# Patient Record
Sex: FEMALE | Race: WHITE | NOT HISPANIC OR LATINO | Employment: OTHER | URBAN - NONMETROPOLITAN AREA
[De-identification: names, ages, dates, MRNs, and addresses within clinical notes are randomized per-mention and may not be internally consistent; named-entity substitution may affect disease eponyms.]

---

## 2023-03-10 ENCOUNTER — PATIENT MESSAGE (OUTPATIENT)
Dept: PRIMARY CARE | Facility: CLINIC | Age: 58
End: 2023-03-10
Payer: COMMERCIAL

## 2023-03-10 DIAGNOSIS — B37.9 CANDIDA INFECTION: Primary | ICD-10-CM

## 2023-03-10 RX ORDER — FLUCONAZOLE 100 MG/1
100 TABLET ORAL ONCE
Qty: 3 TABLET | Refills: 1 | Status: SHIPPED | OUTPATIENT
Start: 2023-03-10 | End: 2023-03-10

## 2023-04-08 DIAGNOSIS — Z79.890 HORMONE REPLACEMENT THERAPY: Primary | ICD-10-CM

## 2023-04-08 RX ORDER — ESTRADIOL 0.04 MG/D
PATCH, EXTENDED RELEASE TRANSDERMAL
Qty: 8 PATCH | Refills: 0 | Status: SHIPPED | OUTPATIENT
Start: 2023-04-08 | End: 2023-05-10

## 2023-05-01 DIAGNOSIS — E03.9 HYPOTHYROIDISM, UNSPECIFIED TYPE: Primary | ICD-10-CM

## 2023-05-01 RX ORDER — LEVOTHYROXINE SODIUM 75 UG/1
TABLET ORAL
Qty: 90 TABLET | Refills: 0 | Status: SHIPPED | OUTPATIENT
Start: 2023-05-01 | End: 2023-08-24

## 2023-05-10 DIAGNOSIS — Z79.890 HORMONE REPLACEMENT THERAPY: ICD-10-CM

## 2023-05-10 RX ORDER — ESTRADIOL 0.04 MG/D
PATCH, EXTENDED RELEASE TRANSDERMAL
Qty: 8 PATCH | Refills: 1 | Status: SHIPPED | OUTPATIENT
Start: 2023-05-10 | End: 2023-05-24 | Stop reason: SDUPTHER

## 2023-05-24 ENCOUNTER — OFFICE VISIT (OUTPATIENT)
Dept: PRIMARY CARE | Facility: CLINIC | Age: 58
End: 2023-05-24
Payer: COMMERCIAL

## 2023-05-24 VITALS
BODY MASS INDEX: 26.36 KG/M2 | HEIGHT: 66 IN | DIASTOLIC BLOOD PRESSURE: 76 MMHG | WEIGHT: 164 LBS | HEART RATE: 68 BPM | SYSTOLIC BLOOD PRESSURE: 120 MMHG | OXYGEN SATURATION: 98 %

## 2023-05-24 DIAGNOSIS — Z79.890 HORMONE REPLACEMENT THERAPY: ICD-10-CM

## 2023-05-24 DIAGNOSIS — M25.50 ARTHRALGIA OF MULTIPLE JOINTS: ICD-10-CM

## 2023-05-24 DIAGNOSIS — Z01.419 ENCOUNTER FOR ANNUAL ROUTINE GYNECOLOGICAL EXAMINATION: Primary | ICD-10-CM

## 2023-05-24 DIAGNOSIS — L30.9 DERMATITIS: ICD-10-CM

## 2023-05-24 DIAGNOSIS — N84.2 VAGINAL POLYP: ICD-10-CM

## 2023-05-24 DIAGNOSIS — Z12.31 SCREENING MAMMOGRAM, ENCOUNTER FOR: ICD-10-CM

## 2023-05-24 PROBLEM — M85.80 OSTEOPENIA: Status: ACTIVE | Noted: 2023-05-24

## 2023-05-24 PROBLEM — E53.8 VITAMIN B12 DEFICIENCY: Status: ACTIVE | Noted: 2023-05-24

## 2023-05-24 PROBLEM — A63.0 CONDYLOMA ACUMINATUM: Status: ACTIVE | Noted: 2023-05-24

## 2023-05-24 PROBLEM — E03.9 HYPOTHYROIDISM: Status: ACTIVE | Noted: 2023-05-24

## 2023-05-24 PROBLEM — J30.9 ALLERGIC RHINITIS: Status: ACTIVE | Noted: 2023-05-24

## 2023-05-24 PROBLEM — N95.1 MENOPAUSAL SYMPTOMS: Status: ACTIVE | Noted: 2023-05-24

## 2023-05-24 PROBLEM — S16.1XXA CERVICAL STRAIN: Status: ACTIVE | Noted: 2023-05-24

## 2023-05-24 PROBLEM — G25.81 RESTLESS LEG SYNDROME: Status: ACTIVE | Noted: 2023-05-24

## 2023-05-24 PROBLEM — M25.561 KNEE PAIN, RIGHT: Status: ACTIVE | Noted: 2023-05-24

## 2023-05-24 PROBLEM — E28.39 ESTROGEN DEFICIENCY: Status: ACTIVE | Noted: 2023-05-24

## 2023-05-24 PROBLEM — E78.5 HYPERLIPIDEMIA: Status: ACTIVE | Noted: 2023-05-24

## 2023-05-24 PROBLEM — G62.9 NEUROPATHY: Status: ACTIVE | Noted: 2023-05-24

## 2023-05-24 PROBLEM — R53.83 FATIGUE: Status: ACTIVE | Noted: 2023-05-24

## 2023-05-24 PROBLEM — H04.122 DRY EYE SYNDROME OF LEFT EYE: Status: ACTIVE | Noted: 2023-05-24

## 2023-05-24 PROBLEM — N60.29 FIBROADENOSIS OF BREAST: Status: ACTIVE | Noted: 2023-05-24

## 2023-05-24 PROBLEM — H15.102 EPISCLERITIS OF LEFT EYE: Status: ACTIVE | Noted: 2023-05-24

## 2023-05-24 PROBLEM — M51.26 LUMBAR HERNIATED DISC: Status: ACTIVE | Noted: 2023-05-24

## 2023-05-24 PROBLEM — B35.1 ONYCHOMYCOSIS OF GREAT TOE: Status: ACTIVE | Noted: 2023-05-24

## 2023-05-24 PROBLEM — E55.9 VITAMIN D DEFICIENCY: Status: ACTIVE | Noted: 2023-05-24

## 2023-05-24 PROBLEM — G47.00 INSOMNIA: Status: ACTIVE | Noted: 2023-05-24

## 2023-05-24 PROBLEM — M19.079 ARTHRITIS OF GREAT TOE AT METATARSOPHALANGEAL JOINT: Status: ACTIVE | Noted: 2023-05-24

## 2023-05-24 PROBLEM — N90.5 VULVAR ATROPHY: Status: ACTIVE | Noted: 2023-05-24

## 2023-05-24 PROBLEM — M72.2 PLANTAR FASCIITIS: Status: ACTIVE | Noted: 2023-05-24

## 2023-05-24 PROBLEM — E78.49 ESSENTIAL FAMILIAL HYPERLIPIDEMIA: Status: ACTIVE | Noted: 2023-05-24

## 2023-05-24 PROBLEM — M79.7 FIBROMYALGIA: Status: ACTIVE | Noted: 2023-05-24

## 2023-05-24 PROCEDURE — 99396 PREV VISIT EST AGE 40-64: CPT | Performed by: FAMILY MEDICINE

## 2023-05-24 PROCEDURE — 1036F TOBACCO NON-USER: CPT | Performed by: FAMILY MEDICINE

## 2023-05-24 RX ORDER — METHOCARBAMOL 750 MG/1
1 TABLET, FILM COATED ORAL EVERY 6 HOURS PRN
COMMUNITY
Start: 2017-08-15 | End: 2023-05-24 | Stop reason: ALTCHOICE

## 2023-05-24 RX ORDER — CALCITRIOL 0.25 UG/1
0.25 CAPSULE ORAL DAILY
COMMUNITY
End: 2023-05-24 | Stop reason: ENTERED-IN-ERROR

## 2023-05-24 RX ORDER — EPINEPHRINE 0.22MG
AEROSOL WITH ADAPTER (ML) INHALATION
COMMUNITY

## 2023-05-24 RX ORDER — LANOLIN ALCOHOL/MO/W.PET/CERES
1 CREAM (GRAM) TOPICAL DAILY
COMMUNITY
Start: 2015-12-07

## 2023-05-24 RX ORDER — ETODOLAC 400 MG/1
400 TABLET, EXTENDED RELEASE ORAL 2 TIMES DAILY PRN
COMMUNITY
End: 2023-05-24 | Stop reason: SDUPTHER

## 2023-05-24 RX ORDER — CALCIUM CARBONATE 300MG(750)
400 TABLET,CHEWABLE ORAL DAILY
COMMUNITY

## 2023-05-24 RX ORDER — ETODOLAC 400 MG/1
400 TABLET, EXTENDED RELEASE ORAL DAILY PRN
Qty: 90 TABLET | Refills: 1 | Status: SHIPPED | OUTPATIENT
Start: 2023-05-24

## 2023-05-24 RX ORDER — MULTIVITAMIN
1 TABLET ORAL DAILY
COMMUNITY
End: 2023-05-24 | Stop reason: ALTCHOICE

## 2023-05-24 RX ORDER — CLOBETASOL PROPIONATE 0.46 MG/ML
SOLUTION TOPICAL
Qty: 50 ML | Refills: 11 | Status: SHIPPED | OUTPATIENT
Start: 2023-05-24 | End: 2023-11-17 | Stop reason: ALTCHOICE

## 2023-05-24 RX ORDER — MUPIROCIN 20 MG/G
1 OINTMENT TOPICAL 2 TIMES DAILY PRN
Status: ON HOLD | COMMUNITY
End: 2024-01-09 | Stop reason: ALTCHOICE

## 2023-05-24 RX ORDER — FLUTICASONE PROPIONATE 50 MCG
SPRAY, SUSPENSION (ML) NASAL
COMMUNITY
Start: 2017-02-02

## 2023-05-24 RX ORDER — ACETAMINOPHEN 500 MG
TABLET ORAL
COMMUNITY
End: 2023-05-24 | Stop reason: ALTCHOICE

## 2023-05-24 RX ORDER — CLOBETASOL PROPIONATE 0.46 MG/ML
SOLUTION TOPICAL
COMMUNITY
Start: 2015-01-16 | End: 2023-05-24 | Stop reason: SDUPTHER

## 2023-05-24 RX ORDER — ALPRAZOLAM 1 MG/1
1 TABLET ORAL NIGHTLY PRN
COMMUNITY
End: 2024-05-22 | Stop reason: SDUPTHER

## 2023-05-24 RX ORDER — OMEGA-3 FATTY ACIDS 1000 MG
1000 CAPSULE ORAL 2 TIMES DAILY
COMMUNITY
End: 2023-11-17 | Stop reason: ALTCHOICE

## 2023-05-24 RX ORDER — PROGESTERONE, MICRONIZED 100 %
POWDER (GRAM) MISCELLANEOUS
COMMUNITY
End: 2024-02-26

## 2023-05-24 RX ORDER — ESTRADIOL 0.04 MG/D
PATCH, EXTENDED RELEASE TRANSDERMAL
Qty: 24 PATCH | Refills: 3 | Status: SHIPPED | OUTPATIENT
Start: 2023-05-25 | End: 2023-11-15 | Stop reason: SDUPTHER

## 2023-05-24 NOTE — PROGRESS NOTES
Subjective   Patient ID: Rachel Hobbs is a 58 y.o. female who presents for Gynecologic Exam.    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    She is living with Ryan in a rental in the area     Still lots of stress with her parents           [facility] -      Last WWE - 2022   (did not do pelvic)     Reviewed WWE paper today     Last pap - NONE NEEDED - S/P HYST - BUT ON HRT ;LAST Pelvic exam done   - will do today   (Need GC/CT screen?) - low risk   LMP - NONE SINCE HYST - AGE 37 - COMPLETE HYST DUE TO ENDOMETRIOSIS - HRT SINCE THEN  problems? - Still PAINFUL INTERCOURSE - AVOIDS - very tight pelvic muscles     HRT - on Vivelle-Dot PATCH TWICE A WEEK - 0.0375 AND PROGESTERONE CREAM THIGHS -   VAGINAL ESTROGEN DID NOT HELP. 2021- tried the 0.025 - found herself way too irritable - feels much better back on 0.0375  (CANNOT HAVE THE WEEKLY ESTROGEN PATCH - CLIMERA or Estradiol - REACTED TO both of them )    We have discussed risks of HRT therapy reglarly - she voices understanding and wishes to continue despite risk.        - 1  Para - 1    Last mammogram - 2022 - MAMM WNL   Breast issues? - NONE CURRENTLY    Bone density - OSTEOPENIA; ON VIT D; has calcium in her diet   Last DEXA 2016 (STABLE FROM 10 YEARS EARLIER)     Last colonoscopy - 2019 - Colonoscopy - had polyp removed - good for 5 years     Last cholesterol - VERY HIGH CHOLESTEROL - INTOL OF STATINS, ZETIA AND REPATHA - We have elected to stop checking as she is not willing to try anything else     Last blood sugar - 2022 - WNL     Hep C screen? -   NEG 2022   HIV screen? - LOW RISK    vaccines -   ALLERGIC TO TETNAS,   DOES NOT GET FLU SHOTS;   SHINGLES - NOT SURE   DID GET COVID VACCINES x 2 and boosterS     vision - did go in 2021     dentist - DOES GO REGULARLY    BMI/weight - 28    nutrition - DOING WELL; tried no gluten - no help     exercise - busy - but not regular exercise  "    depression - she states she is feeling she is doing well     smoking status - FORMER SMOKER - QUIT about (2013) - smoked 3 - 6 cig a day for 25 years   Need Screening Lung CT? Does not qualify     alcohol consumption - RARE    Need coronary calcium score? - ZERO IN 2016 (that was second scan)       Living Will and Medical POA papers. - DONE - BUT DID NOT BRING IN           CHRONIC ISSUES REVIEWED TODAY :      Hypothyroid: Synthroid 75 one daily - 1/2 Tues and Thurs ;     Familial Hyperlipidemia - (LDL over 300) - HAS NOT BEEN ABLE TO TOLERATE THE STATINS, ZETIA or Repatha    - refuses to try more , we have elected to stop checking   Cor Score Zero x 2 2011 and 2016       Fibromyalgia and Diffuse DDD - diffuse chronic pain   Spinal surgery with DR Terrell at [facility] on July 9th 2019 - 2 spurs were removed and found ligaments pressing against a nerve - took away stabbing pains   Did not feel well on Gabapentin   Meds - Etodolac  mg prn; methocarbamol  - has not taken     insomnia -sleeps well the first few hours , then wakes up and thinks   (Trazodone did not help)     RLS - tried supplement - did not help much       Vit D Def -  2000 SL every other week         Gets sores in the pubic area occas - mupirocin helps     AR - stays on flonase       Has a small bottle of alprazolam for travel only       Review of Systems    Objective   /76   Pulse 68   Ht 1.676 m (5' 6\")   Wt 74.4 kg (164 lb)   SpO2 98%   BMI 26.47 kg/m²     Physical Exam  Vitals reviewed.   Constitutional:       Appearance: Normal appearance.   HENT:      Head: Normocephalic and atraumatic.      Right Ear: Tympanic membrane, ear canal and external ear normal.      Left Ear: Tympanic membrane, ear canal and external ear normal.      Nose: Nose normal.      Mouth/Throat:      Mouth: Mucous membranes are moist.      Pharynx: No posterior oropharyngeal erythema.   Eyes:      General: No scleral icterus.     Extraocular Movements: " Extraocular movements intact.      Pupils: Pupils are equal, round, and reactive to light.   Cardiovascular:      Rate and Rhythm: Normal rate and regular rhythm.      Pulses: Normal pulses.      Heart sounds: Normal heart sounds. No murmur heard.  Pulmonary:      Effort: Pulmonary effort is normal. No respiratory distress.      Breath sounds: Normal breath sounds. No wheezing.   Chest:      Chest wall: No deformity.   Breasts:     Laron Score is 5.      Breasts are symmetrical.      Right: Normal. No mass.      Left: Normal. No mass.   Abdominal:      General: Bowel sounds are normal. There is no distension.      Palpations: Abdomen is soft.      Tenderness: There is no abdominal tenderness.   Genitourinary:     General: Normal vulva.      Exam position: Lithotomy position.      Pubic Area: No rash.       Labia:         Right: No lesion.         Left: No lesion.       Vagina: Lesions present. No vaginal discharge.      Uterus: Absent.       Adnexa: Right adnexa normal and left adnexa normal.      Comments: ON RIGHT WALL OF VAGINA THERE IS A 1 CM PEDUNCULATED POLYP   Musculoskeletal:         General: Normal range of motion.      Cervical back: Neck supple. No rigidity.      Right lower leg: No edema.      Left lower leg: No edema.   Lymphadenopathy:      Cervical: No cervical adenopathy.      Upper Body:      Right upper body: No axillary adenopathy.      Left upper body: No axillary adenopathy.   Skin:     General: Skin is warm and dry.      Findings: No rash.   Neurological:      General: No focal deficit present.      Mental Status: She is alert and oriented to person, place, and time.   Psychiatric:         Mood and Affect: Mood normal.         Thought Content: Thought content normal.         Assessment/Plan   Problem List Items Addressed This Visit          Medium    Arthralgia of multiple joints    Relevant Medications    etodolac XL (Lodine XL) 400 mg 24 hr tablet    Hormone replacement therapy    Relevant  Medications    Vivelle-Dot 0.0375 mg/24 hr (Start on 5/25/2023)    Screening mammogram, encounter for    Relevant Orders    BI mammo bilateral screening tomosynthesis    Dermatitis    Relevant Medications    clobetasol (Temovate) 0.05 % external solution    Encounter for annual routine gynecological examination - Primary    Vaginal polyp    Relevant Orders    Referral to Gynecology     Not doing too bad -   Lots of stress - urged counseling    She wants to continue HRT despite risk    Vaginal polyp felt today  - refer to gyn     We discussed at visit any disease processes that were of concern as well as the risks, benefits and instructions of any new medication provided.    See orders and discussion section for information handed to patient on their Clinical Summary.   Patient (and/or caretaker of patient if present)  stated all questions were answered, and they voiced understanding of instructions.

## 2023-05-24 NOTE — PATIENT INSTRUCTIONS
Try to find the amount of vit D that you can tolerate  - up to 2000 units daily     I strongly recommend starting with counseling     I want you to see a gyn about the vaginal polyp -  Call DR Perez -   [phone] -   The appt should not be more than a month away.           I can see you back in 6 months  if all is fine.         WELL VISIT/PREVENTATIVE VISIT INSTRUCTIONS:        Call [phone] to schedule any testing ordered at [facility].      For a mammogram, call   [phone] .    Please work on healthy nutrition,  optimal sleep, and regular exercise to feel better.    If you smoke - please quit, and if you drink alcohol, please limit your intake.     Generally speaking,  good nutrition consists of lean meats, like chicken, fish and turkey (not fried);    plenty of fruits and vegetables (the fresher the better);   Less sugars, saturated fats, and processed foods - especially those made with white flour.   Try to eat the majority of your grain foods  as whole grains.   Keep an eye on your total calories in a day and serving sizes on packaging - this will help you limit your overall intake.    Remember, to lose weight (if you need to), your total calorie intake has to be about 300 - 500 calories less than what you burn in a day.   That number depends on your age, gender and body size.   Some people find a fitbit (calorie tracking device) helpful.      Please be sure to see the dentist every 6 months.    Please see the eye doctor no longer than every 2 years.    When you have your Living Will and Medical Power of  papers completed   (they have to be witnessed by 2 non-relatives or notarized to be legally binding),     please keep your originals in a safe place in your home, but make sure all your physicians have copies and that you take copies with you when you go to the hospital.        GETTING TEST RESULTS:    YOU WILL ALWAYS FIND OUT ABOUT ALL YOUR TEST RESULTS FROM ME.  I do not use  "the \"no news is good news\" policy.   The only time you would not, is if I have told you to get the testing done, and make a follow up appointment to go over it.    Then I will be waiting to talk to you at the visit about your test results.     I have a few different ways I get test results to you  (if its something urgent, we call you)  :       If you have a Secureach card -  I will have your test results on your secureach card within a week.  You should get a phone call telling you when the results are on the card, or just call the card in a week.   If two weeks go  by and you have not heard anything, call the card to see if the results are there,  and if not,  leave me a message.  If you are having trouble using Secureach, they have a support line you should call :   [phone];   If you have lost your card, I need to know.     IT'S VERY IMPORTANT THAT YOU CALL TO LISTEN TO YOUR RESULTS, AS IT THEN LETS ME KNOW YOU GOT YOUR RESULTS.        If you get your test results on MY CHART,  you may commonly see your results even before I do.      I will always annotate a message on your results so you know that I saw them and how I feel about them.         IF I mail your results to you,   I need to hear from you if you do not receive them within 2 weeks.      Be sure to let me know your preference for getting results.         BILLING FOR PREVENTATIVE VISITS.     Most insurance companies pay for a yearly \"Wellness Check\"  or they may call it a \"Preventative Physical\"   - but it's important to know what your plan covers ahead of the visit.    It's also a good idea to find out what sort of preventative testing they will cover for screening tests - like cholesterol, blood sugar, or colonoscopies. Also, find out which vaccines are covered and if you have any responsibility in paying for them.       If at your Wellness Visit,  we cover anything to do with problems/issues  you are having or  chronic medications/ medical " conditions you have,   there will ALSO be a regular office charge that day.     Blood work  or testing obtained that has anything to do with an acute issue or chronic condition is billed under that diagnosis and not screening.       It's a good idea to find out from your insurance what is covered and what is your responsibility for all of the above possible charges.       Most importantly,   if you ever have any questions or concerns that cannot wait until your next visit with me,  you can message me through MY CHART or call the office and leave a voice mail message  (please allow for at least 24 hours for responses for these messages).       Take good care.   Dr Hubbard

## 2023-08-24 DIAGNOSIS — E03.9 HYPOTHYROIDISM, UNSPECIFIED TYPE: ICD-10-CM

## 2023-08-24 RX ORDER — LEVOTHYROXINE SODIUM 75 UG/1
TABLET ORAL
Qty: 90 TABLET | Refills: 1 | Status: SHIPPED | OUTPATIENT
Start: 2023-08-24 | End: 2023-11-17 | Stop reason: SDUPTHER

## 2023-11-15 ENCOUNTER — TELEPHONE (OUTPATIENT)
Dept: PRIMARY CARE | Facility: CLINIC | Age: 58
End: 2023-11-15
Payer: COMMERCIAL

## 2023-11-15 DIAGNOSIS — Z79.890 HORMONE REPLACEMENT THERAPY: ICD-10-CM

## 2023-11-15 RX ORDER — ESTRADIOL 0.04 MG/D
FILM, EXTENDED RELEASE TRANSDERMAL
Qty: 24 PATCH | Refills: 3 | Status: SHIPPED | OUTPATIENT
Start: 2023-11-15 | End: 2023-11-17

## 2023-11-15 NOTE — TELEPHONE ENCOUNTER
Her  vivelle dot patch .0375 has been unavailable for over a month and she has been going without it.  She is willing to try the generic.  Please send over the prescription without the MILTON, which is what I have so I cannot dispense the generic.

## 2023-11-17 ENCOUNTER — OFFICE VISIT (OUTPATIENT)
Dept: PRIMARY CARE | Facility: CLINIC | Age: 58
End: 2023-11-17
Payer: COMMERCIAL

## 2023-11-17 VITALS
BODY MASS INDEX: 26.15 KG/M2 | SYSTOLIC BLOOD PRESSURE: 124 MMHG | WEIGHT: 162 LBS | OXYGEN SATURATION: 99 % | HEART RATE: 77 BPM | DIASTOLIC BLOOD PRESSURE: 78 MMHG

## 2023-11-17 DIAGNOSIS — M79.7 FIBROMYALGIA: ICD-10-CM

## 2023-11-17 DIAGNOSIS — E03.9 HYPOTHYROIDISM, UNSPECIFIED TYPE: ICD-10-CM

## 2023-11-17 DIAGNOSIS — E53.8 VITAMIN B12 DEFICIENCY: ICD-10-CM

## 2023-11-17 DIAGNOSIS — E28.39 ESTROGEN DEFICIENCY: Primary | ICD-10-CM

## 2023-11-17 DIAGNOSIS — E55.9 VITAMIN D DEFICIENCY: ICD-10-CM

## 2023-11-17 DIAGNOSIS — E78.49 OTHER HYPERLIPIDEMIA: ICD-10-CM

## 2023-11-17 PROBLEM — R53.83 MALAISE AND FATIGUE: Status: ACTIVE | Noted: 2019-03-15

## 2023-11-17 PROBLEM — R53.81 MALAISE AND FATIGUE: Status: ACTIVE | Noted: 2019-03-15

## 2023-11-17 PROBLEM — M54.50 LOW BACK PAIN: Status: ACTIVE | Noted: 2019-03-15

## 2023-11-17 PROBLEM — E78.00 PURE HYPERCHOLESTEROLEMIA: Status: ACTIVE | Noted: 2019-03-15

## 2023-11-17 PROBLEM — M13.0 POLYARTHROPATHY: Status: ACTIVE | Noted: 2019-03-15

## 2023-11-17 PROBLEM — M75.102 TEAR OF LEFT ROTATOR CUFF: Status: RESOLVED | Noted: 2023-11-17 | Resolved: 2023-11-17

## 2023-11-17 PROBLEM — L73.9 DISORDER OF SEBACEOUS GLANDS: Status: ACTIVE | Noted: 2019-04-22

## 2023-11-17 PROBLEM — M51.369 DEGENERATIVE DISC DISEASE, LUMBAR: Status: ACTIVE | Noted: 2019-04-19

## 2023-11-17 PROBLEM — M51.36 DEGENERATIVE DISC DISEASE, LUMBAR: Status: ACTIVE | Noted: 2019-04-19

## 2023-11-17 LAB
25(OH)D3 SERPL-MCNC: 23 NG/ML (ref 30–100)
ALBUMIN SERPL BCP-MCNC: 5.1 G/DL (ref 3.4–5)
ALP SERPL-CCNC: 52 U/L (ref 33–110)
ALT SERPL W P-5'-P-CCNC: 22 U/L (ref 7–45)
ANION GAP SERPL CALC-SCNC: 12 MMOL/L (ref 10–20)
AST SERPL W P-5'-P-CCNC: 21 U/L (ref 9–39)
BASOPHILS # BLD AUTO: 0.04 X10*3/UL (ref 0–0.1)
BASOPHILS NFR BLD AUTO: 0.8 %
BILIRUB SERPL-MCNC: 0.5 MG/DL (ref 0–1.2)
BUN SERPL-MCNC: 17 MG/DL (ref 6–23)
CALCIUM SERPL-MCNC: 9.7 MG/DL (ref 8.6–10.3)
CHLORIDE SERPL-SCNC: 102 MMOL/L (ref 98–107)
CO2 SERPL-SCNC: 29 MMOL/L (ref 21–32)
CREAT SERPL-MCNC: 1.01 MG/DL (ref 0.5–1.05)
EOSINOPHIL # BLD AUTO: 0.16 X10*3/UL (ref 0–0.7)
EOSINOPHIL NFR BLD AUTO: 3.2 %
ERYTHROCYTE [DISTWIDTH] IN BLOOD BY AUTOMATED COUNT: 13.2 % (ref 11.5–14.5)
GFR SERPL CREATININE-BSD FRML MDRD: 64 ML/MIN/1.73M*2
GLUCOSE SERPL-MCNC: 90 MG/DL (ref 74–99)
HCT VFR BLD AUTO: 45.6 % (ref 36–46)
HGB BLD-MCNC: 14.9 G/DL (ref 12–16)
IMM GRANULOCYTES # BLD AUTO: 0.01 X10*3/UL (ref 0–0.7)
IMM GRANULOCYTES NFR BLD AUTO: 0.2 % (ref 0–0.9)
LYMPHOCYTES # BLD AUTO: 1.73 X10*3/UL (ref 1.2–4.8)
LYMPHOCYTES NFR BLD AUTO: 34.5 %
MCH RBC QN AUTO: 30 PG (ref 26–34)
MCHC RBC AUTO-ENTMCNC: 32.7 G/DL (ref 32–36)
MCV RBC AUTO: 92 FL (ref 80–100)
MONOCYTES # BLD AUTO: 0.37 X10*3/UL (ref 0.1–1)
MONOCYTES NFR BLD AUTO: 7.4 %
NEUTROPHILS # BLD AUTO: 2.7 X10*3/UL (ref 1.2–7.7)
NEUTROPHILS NFR BLD AUTO: 53.9 %
NRBC BLD-RTO: 0 /100 WBCS (ref 0–0)
PLATELET # BLD AUTO: 251 X10*3/UL (ref 150–450)
POTASSIUM SERPL-SCNC: 4.8 MMOL/L (ref 3.5–5.3)
PROT SERPL-MCNC: 7.4 G/DL (ref 6.4–8.2)
RBC # BLD AUTO: 4.97 X10*6/UL (ref 4–5.2)
SODIUM SERPL-SCNC: 138 MMOL/L (ref 136–145)
TSH SERPL-ACNC: 1.31 MIU/L (ref 0.44–3.98)
VIT B12 SERPL-MCNC: 376 PG/ML (ref 211–911)
WBC # BLD AUTO: 5 X10*3/UL (ref 4.4–11.3)

## 2023-11-17 PROCEDURE — 80053 COMPREHEN METABOLIC PANEL: CPT

## 2023-11-17 PROCEDURE — 36415 COLL VENOUS BLD VENIPUNCTURE: CPT

## 2023-11-17 PROCEDURE — 82607 VITAMIN B-12: CPT

## 2023-11-17 PROCEDURE — 84443 ASSAY THYROID STIM HORMONE: CPT

## 2023-11-17 PROCEDURE — 99214 OFFICE O/P EST MOD 30 MIN: CPT | Performed by: FAMILY MEDICINE

## 2023-11-17 PROCEDURE — 82306 VITAMIN D 25 HYDROXY: CPT

## 2023-11-17 PROCEDURE — 1036F TOBACCO NON-USER: CPT | Performed by: FAMILY MEDICINE

## 2023-11-17 PROCEDURE — 85025 COMPLETE CBC W/AUTO DIFF WBC: CPT

## 2023-11-17 RX ORDER — LEVOTHYROXINE SODIUM 75 UG/1
TABLET ORAL
Qty: 90 TABLET | Refills: 3 | Status: SHIPPED | OUTPATIENT
Start: 2023-11-17

## 2023-11-17 RX ORDER — MV-MIN/FA/VIT K/LUTEIN/ZEAXANT 200MCG-5MG
CAPSULE ORAL
COMMUNITY

## 2023-11-17 RX ORDER — ESTRADIOL 0.04 MG/D
1 FILM, EXTENDED RELEASE TRANSDERMAL 2 TIMES WEEKLY
Qty: 24 PATCH | Refills: 3 | Status: SHIPPED | OUTPATIENT
Start: 2023-11-20 | End: 2024-05-22 | Stop reason: RX

## 2023-11-17 NOTE — PATIENT INSTRUCTIONS
"You may want to try topical Voltaren gel for pain       You will always hear about your test results.     The easiest way, if you have a smart phone or computer access, is to sign up for \"My Chart.\"    The electronic way to see your medical record, test results and visit summaries/instructions.   You will see your test results on this system before I do.   But, I will always make comments on the results when I see them.   If over a week goes by and I have not made comments on them,  please let me know.    Something may have gone wrong and I did not see them.    If you are having issues with getting onto My Chart , the patient support  number is [phone].       If you do not have a smart phone or computer access, I can still leave test results on your Secureach card if you have one,   or we can call or mail you your results.   IF a week goes by and you have not heard about your results,  please let me know.          I did send in the MILTON Minivelle      Appt can be in 6 mos -  Wellness         For General Healthy Nutrition    (Remember - NOT A DIET!   Diets are only good for class reunions.)    These are my general good nutrition recommendations for most people.   I use the term \" diet \"  in these instructions to mean your overall nutrition - how you eat and drink.   If we talked about something different during your visit with me,  other than what is written below,  follow that advice instead.       For most people,  eating healthier means getting less added sugar and less processed foods in your diet    The fresher the better.    Added sugar is now a part of the nutrition label on manufactured food, so you can keep an eye on it easier.    But basically,  foods and beverages  that contain regular sugar and corn syrup are the main sources of added sugars.  Eating as little of these foods as you can is best.   One shocking example of the epidemic of added sugar is soda.    One can of regular soda contains about as " much added sugar as 3 regular size doughnuts!     The other issue with processed foods is the amount of processed grains they contain , such as white flour.    This is also something you want to try to limit in your diet.     But, grain products are very important for your nutrition.    Whole grains are better for your body.     Cutting back on white breads, traditional pasta, baked goods, white rice,  and processed cereals will be healthier for you.   The better choices include whole grain breads,  whole wheat pasta,  brown rice, quinoa, barley, steel cut  or rolled oats.   If you eat cereal for breakfast, try to look for one made with whole grains and less sugar.   There are many people who have a problem with gluten, for a large variety of reasons.    Generally,  products made with wheat flour , barley or rye are the primary source of gluten.       Cutting back on saturated fats is important.    You want the majority of the meat that you eat to be chicken, fish or turkey.   Baked or broiled is best -  fried adds too much fat.    There are healthy fats that are important - fat is important for holding down appetite, vitamin absorption and several metabolic processes in the body.  Monounsaturated fats raise HDL (good cholesterol) and lower LDL (bad cholesterol).   Olive oil, peanut oil, nuts, seeds, and avocados are great sources of the good fats.       Ideas are:   Trade sour cream dip for hummus (which is rich in olive oil) or guacamole; use veggies or whole-wheat chips to dip.    Nuts are an excellent source of protein and healthy fats.   Tree nuts are the best kind, such as almonds or walnuts.   Just be careful - they are high in calories, so stick to a serving size.  (Most are about 200 calories for a 1/4 cup)      Proteins are very important for your body, and they also hold down your appetite.   Try to have protein with every meal.    These generally are meats, nuts, many beans, legumes, eggs, and dairy.    "You will find protein in whole grain products and some green vegetables have a little too.     When you have dairy (if you can - many people are lactose intolerant) try to make it low fat.    Ideas are 1% milk, lowfat yogurt or cheeses, low fat cottage cheese.   I don't generally recommend FAT FREE because they often contain artificial products to improve taste, and the fat helps hold down your appetite.   If you are lactose intolerant, try to see if taking Lactaid before having dairy helps.      Fresh fruits and vegetables are VERY important.  The brighter the better.   Many vegetables are considered \"Free Foods\" - meaning you can eat as much as you want, and it does not matter.  These include tomatoes, cucumbers, celery, peppers, all the various lettuces and kale - to name a few.   Potatoes, corn and peas are starchy, so do have more calories, but are still healthy - you just want to watch the amount of them you eat.       Fruits are full of wonderful nutrition.   They contain natural sugar called fructose, so eating them in moderation is best.   Diabetics may need to pay careful attention to how their body reacts to the sugar.  Some fruits might drastically increase their blood sugar.      Eating smaller meals with a couple of small snacks is better for your metabolism than not eating for long amounts of time  (breakfast is very important).   Trying to avoid large meals is helpful too.    Eating like this helps keep your appetite down and keeps you in burning metabolism rather than storage metabolism so your body will use the calories you eat.       I do not tell people to stop eating sweets or snack foods - just limit the amounts you have.  The less the better.   Pay attention to serving sizes, and treat them as a treat.        Foods like doughnuts, pop tarts, sugar cereals, cookies  ARE NOT GOOD FOR BREAKFAST.   They are loaded with sugar and will cause you to be hungrier in the day and often not feel " well.    Caffeine needs to be limited - no more than 2 servings a day.  Some people can't have any at all.    (if you have any sleep or anxiety issues - stop the caffeine)   Coffee, many teas, many sodas, energy drinks, almost any diet supplement,  and chocolate all contain caffeine.      Water is important.   For most people, 8   x  8 ounces  a day are needed.  This may vary for some health issues.    If you need to be on a low sodium diet, that means looking at labels and eating only 1000 - 2000 mg of sodium a day.    Calcium intake is important.  3 servings of a high calcium food or drink a day is recommended.   This is usually a cup of milk, a cup of yogurt, an ounce of a hard cheese or 1.5 ounces of a soft cheese are the usual servings.   There are other high calcium foods - including soy or almond milk, broccoli,  almonds, dark green leafy vegetable.   Make sure you are not getting more than 1000  - 1200 mg of total calcium a day (unless you have been told you need more by a doctor).    Vitamin D 3 is important to absorb the calcium and for your immune system.   For children, 400 IU a day is recommended.   For adults - 800 - 5000 IU a day  is recommended.  (Often the amount needed is individualized for adults - be sure to ask how much is right for you)    Physical activity is very important for good health.    Finding activities that give you regular exercise is very important for good health.  Try to find exercise you enjoy doing on a regular basis.    30 minutes at least 5 days a week of a good cardiovascular exercise is recommended.   That means something that gets your heart rate going faster than your usual baseline and you can find yourself breathing harder than usual while you are exercising.  If you have not done any exercise in a long time,  make sure you ask if its safe for you to start,  and be sure to gradually work up to your goal.      If you need to lose weight,  following these recommendations  will help you.   And if you are doing all of this and still not losing weight, then its likely just the amount of food you are eating.   Learn to cut back on portion sizes.  Using smaller plates may help.  Healthy weight loss is  only about a pound a week.   You have to remember that whatever you do to lose the weight, you must be prepared to keep it up for life for the weight to stay off.     A lot of people have a lot of luck with using something like a fit bit,  or a program where you keep track of all of your calories that you eat and what you burn off in the day.

## 2023-11-17 NOTE — PROGRESS NOTES
cSubjective   Patient ID: Rachel Hobbs is a 58 y.o. female who presents for Follow-up (6 month follow up.).    HPI     LOV  5/2023 - WWE     Updates and concerns:     Last appt - felt a vaginal polyp  - referred to Ana     Wanted her to see if she could take any Vit D     MILTON Vivelle dot has been on back order -   I recently sent in order for generic to try again     Saw DR Perez - started Imvexxy (vaginal estradiol inserts) for vaginal tissues  -   Plans surgery in January   Feels the same     She knows she cannot tolerate the generic Vivelle Dot -   Would be willing to try the Minivelle       Stress is high  - did not check into counseling     (Dealing with aging parents)     Did get her eye exam  -   Macular degen runs in family   Started Preservision            CHRONIC ISSUES REVIEWED TODAY :     HRT - on Vivelle-Dot PATCH TWICE A WEEK - 0.0375 AND PROGESTERONE CREAM THIGHS -   VAGINAL ESTROGEN DID NOT HELP. 5/2021- tried the 0.025 - found herself way too irritable - feels much better back on 0.0375  (CANNOT HAVE THE WEEKLY ESTROGEN PATCH - CLIMERA or Estradiol - REACTED TO both of them )    Hypothyroid:  Synthroid 75 one daily - 1/2 Tues and Thurs ;   Not missing doses  Takes in the AM  -   Takes it alone  -  20 min later has black coffee      Familial Hyperlipidemia - (LDL over 300) - HAS NOT BEEN ABLE TO TOLERATE THE STATINS, ZETIA or Repatha    - refuses to try more , we have elected to stop checking   Cor Score Zero x 2 2011 and 2016         Fibromyalgia and Diffuse DDD - diffuse chronic pain   Spinal surgery with DR Terrell at [facility], [place], operated by [facility] on July 9th 2019 - 2 spurs were removed and found ligaments pressing against a nerve - took away stabbing pains   Did not feel well on Gabapentin   Meds - Etodolac  mg prn - needs it when she over does it     Pain is always there at about 3/10       Stretches 3 - 4 times a week          Takes alprazolam  1 mg -     1/4 tab to help sleep when has severe pain            (Has 4 left)      insomnia -sleeps well the first few hours , then wakes up and thinks   (Trazodone did not help)        RLS - tried supplement - did not help much         Vit D Def -  Tried a few different Vit Ds -      Still couldn't tolerate them          Gets sores in the pubic area occas - mupirocin helps      AR - stays on flonase         Has a small bottle of alprazolam for travel       vaccines -   ALLERGIC TO TETNAS,   DOES NOT GET FLU SHOTS;   SHINGLES - NOT SURE   DID GET COVID VACCINES x 2 and boosters      Not the new one         Review of Systems    Objective   /78 (BP Location: Right arm, Patient Position: Sitting, BP Cuff Size: Adult)   Pulse 77   Wt 73.5 kg (162 lb)   SpO2 99%   BMI 26.15 kg/m²     Physical Exam  Vitals reviewed.   Constitutional:       General: She is not in acute distress.     Appearance: Normal appearance.   HENT:      Head: Normocephalic and atraumatic.      Nose: Nose normal.      Mouth/Throat:      Mouth: Mucous membranes are moist.      Pharynx: No posterior oropharyngeal erythema.   Eyes:      Extraocular Movements: Extraocular movements intact.      Conjunctiva/sclera: Conjunctivae normal.      Pupils: Pupils are equal, round, and reactive to light.   Cardiovascular:      Rate and Rhythm: Normal rate and regular rhythm.      Heart sounds: Normal heart sounds. No murmur heard.  Pulmonary:      Effort: Pulmonary effort is normal. No respiratory distress.      Breath sounds: Normal breath sounds. No wheezing.   Musculoskeletal:      Cervical back: No rigidity.   Lymphadenopathy:      Cervical: No cervical adenopathy.   Skin:     General: Skin is warm and dry.      Findings: No rash.   Neurological:      General: No focal deficit present.      Mental Status: She is alert. Mental status is at baseline.   Psychiatric:         Mood and Affect: Mood normal.         Thought Content: Thought content normal.         Assessment/Plan   Problem List Items Addressed This  Visit             ICD-10-CM       Medium    Estrogen deficiency - Primary E28.39    Relevant Medications    Minivelle 0.0375 mg/24 hr (Start on 11/20/2023)    Other Relevant Orders    Comprehensive Metabolic Panel    CBC and Auto Differential    Thyroid Stimulating Hormone    Fibromyalgia M79.7    Relevant Orders    Comprehensive Metabolic Panel    CBC and Auto Differential    Thyroid Stimulating Hormone    Hyperlipidemia E78.5    Relevant Orders    Comprehensive Metabolic Panel    CBC and Auto Differential    Thyroid Stimulating Hormone    Hypothyroidism E03.9    Relevant Orders    Comprehensive Metabolic Panel    CBC and Auto Differential    Thyroid Stimulating Hormone    Vitamin B12 deficiency E53.8    Relevant Orders    Vitamin B12    Vitamin D deficiency E55.9    Relevant Orders    Vitamin D 25-Hydroxy,Total (for eval of Vitamin D levels)        58 year old - issues as above-   Generally stable -   Labs today    To have vaginal polyp removed in January     On long time HRT - her usually tx is on back order - intol of generic   To try Minvelle    We discussed at visit any disease processes that were of concern as well as the risks, benefits and instructions of any new medication provided.    See orders and discussion section for information handed to patient on their Clinical Summary.   Patient (and/or caretaker of patient if present)  stated all questions were answered, and they voiced understanding of instructions.

## 2024-01-03 ENCOUNTER — ANESTHESIA EVENT (OUTPATIENT)
Dept: OPERATING ROOM | Facility: HOSPITAL | Age: 59
End: 2024-01-03
Payer: COMMERCIAL

## 2024-01-08 ENCOUNTER — PREP FOR PROCEDURE (OUTPATIENT)
Dept: OBSTETRICS AND GYNECOLOGY | Facility: HOSPITAL | Age: 59
End: 2024-01-08
Payer: COMMERCIAL

## 2024-01-08 RX ORDER — SODIUM CHLORIDE 9 MG/ML
100 INJECTION, SOLUTION INTRAVENOUS CONTINUOUS
Status: CANCELLED | OUTPATIENT
Start: 2024-01-08

## 2024-01-09 ENCOUNTER — ANESTHESIA (OUTPATIENT)
Dept: OPERATING ROOM | Facility: HOSPITAL | Age: 59
End: 2024-01-09
Payer: COMMERCIAL

## 2024-01-09 ENCOUNTER — HOSPITAL ENCOUNTER (OUTPATIENT)
Facility: HOSPITAL | Age: 59
Setting detail: OUTPATIENT SURGERY
Discharge: HOME | End: 2024-01-09
Attending: OBSTETRICS & GYNECOLOGY | Admitting: OBSTETRICS & GYNECOLOGY
Payer: COMMERCIAL

## 2024-01-09 VITALS
SYSTOLIC BLOOD PRESSURE: 115 MMHG | TEMPERATURE: 97.2 F | OXYGEN SATURATION: 100 % | BODY MASS INDEX: 26.89 KG/M2 | RESPIRATION RATE: 14 BRPM | HEIGHT: 65 IN | DIASTOLIC BLOOD PRESSURE: 59 MMHG | WEIGHT: 161.38 LBS | HEART RATE: 52 BPM

## 2024-01-09 DIAGNOSIS — N89.8 VAGINAL CYST: ICD-10-CM

## 2024-01-09 PROCEDURE — 88305 TISSUE EXAM BY PATHOLOGIST: CPT | Performed by: PATHOLOGY

## 2024-01-09 PROCEDURE — 3700000001 HC GENERAL ANESTHESIA TIME - INITIAL BASE CHARGE: Performed by: OBSTETRICS & GYNECOLOGY

## 2024-01-09 PROCEDURE — 88305 TISSUE EXAM BY PATHOLOGIST: CPT | Mod: TC,SUR,GEALAB | Performed by: OBSTETRICS & GYNECOLOGY

## 2024-01-09 PROCEDURE — 3700000002 HC GENERAL ANESTHESIA TIME - EACH INCREMENTAL 1 MINUTE: Performed by: OBSTETRICS & GYNECOLOGY

## 2024-01-09 PROCEDURE — 3600000003 HC OR TIME - INITIAL BASE CHARGE - PROCEDURE LEVEL THREE: Performed by: OBSTETRICS & GYNECOLOGY

## 2024-01-09 PROCEDURE — 3600000008 HC OR TIME - EACH INCREMENTAL 1 MINUTE - PROCEDURE LEVEL THREE: Performed by: OBSTETRICS & GYNECOLOGY

## 2024-01-09 PROCEDURE — 7100000009 HC PHASE TWO TIME - INITIAL BASE CHARGE: Performed by: OBSTETRICS & GYNECOLOGY

## 2024-01-09 PROCEDURE — 7100000010 HC PHASE TWO TIME - EACH INCREMENTAL 1 MINUTE: Performed by: OBSTETRICS & GYNECOLOGY

## 2024-01-09 PROCEDURE — 2500000004 HC RX 250 GENERAL PHARMACY W/ HCPCS (ALT 636 FOR OP/ED): Performed by: ANESTHESIOLOGY

## 2024-01-09 PROCEDURE — 2500000005 HC RX 250 GENERAL PHARMACY W/O HCPCS: Performed by: NURSE ANESTHETIST, CERTIFIED REGISTERED

## 2024-01-09 PROCEDURE — A57135 PR EXCIS VAGINAL CYST/TUMOR: Performed by: NURSE ANESTHETIST, CERTIFIED REGISTERED

## 2024-01-09 PROCEDURE — 2500000004 HC RX 250 GENERAL PHARMACY W/ HCPCS (ALT 636 FOR OP/ED): Performed by: NURSE ANESTHETIST, CERTIFIED REGISTERED

## 2024-01-09 RX ORDER — PROPOFOL 10 MG/ML
INJECTION, EMULSION INTRAVENOUS AS NEEDED
Status: DISCONTINUED | OUTPATIENT
Start: 2024-01-09 | End: 2024-01-09

## 2024-01-09 RX ORDER — OXYCODONE HYDROCHLORIDE 5 MG/1
5 TABLET ORAL EVERY 4 HOURS PRN
Status: DISCONTINUED | OUTPATIENT
Start: 2024-01-09 | End: 2024-01-10 | Stop reason: HOSPADM

## 2024-01-09 RX ORDER — FENTANYL CITRATE 50 UG/ML
INJECTION, SOLUTION INTRAMUSCULAR; INTRAVENOUS AS NEEDED
Status: DISCONTINUED | OUTPATIENT
Start: 2024-01-09 | End: 2024-01-09

## 2024-01-09 RX ORDER — ONDANSETRON HYDROCHLORIDE 2 MG/ML
INJECTION, SOLUTION INTRAVENOUS AS NEEDED
Status: DISCONTINUED | OUTPATIENT
Start: 2024-01-09 | End: 2024-01-09

## 2024-01-09 RX ORDER — PROPOFOL 10 MG/ML
INJECTION, EMULSION INTRAVENOUS CONTINUOUS PRN
Status: DISCONTINUED | OUTPATIENT
Start: 2024-01-09 | End: 2024-01-09

## 2024-01-09 RX ORDER — SODIUM CHLORIDE, SODIUM LACTATE, POTASSIUM CHLORIDE, CALCIUM CHLORIDE 600; 310; 30; 20 MG/100ML; MG/100ML; MG/100ML; MG/100ML
100 INJECTION, SOLUTION INTRAVENOUS CONTINUOUS
Status: DISCONTINUED | OUTPATIENT
Start: 2024-01-09 | End: 2024-01-10 | Stop reason: HOSPADM

## 2024-01-09 RX ORDER — DROPERIDOL 2.5 MG/ML
0.62 INJECTION, SOLUTION INTRAMUSCULAR; INTRAVENOUS ONCE AS NEEDED
Status: DISCONTINUED | OUTPATIENT
Start: 2024-01-09 | End: 2024-01-10 | Stop reason: HOSPADM

## 2024-01-09 RX ORDER — LIDOCAINE HCL/PF 100 MG/5ML
SYRINGE (ML) INTRAVENOUS AS NEEDED
Status: DISCONTINUED | OUTPATIENT
Start: 2024-01-09 | End: 2024-01-09

## 2024-01-09 RX ORDER — PHENYLEPHRINE HCL IN 0.9% NACL 0.4MG/10ML
SYRINGE (ML) INTRAVENOUS AS NEEDED
Status: DISCONTINUED | OUTPATIENT
Start: 2024-01-09 | End: 2024-01-09

## 2024-01-09 RX ORDER — ONDANSETRON HYDROCHLORIDE 2 MG/ML
4 INJECTION, SOLUTION INTRAVENOUS ONCE AS NEEDED
Status: DISCONTINUED | OUTPATIENT
Start: 2024-01-09 | End: 2024-01-10 | Stop reason: HOSPADM

## 2024-01-09 RX ORDER — MIDAZOLAM HYDROCHLORIDE 1 MG/ML
INJECTION INTRAMUSCULAR; INTRAVENOUS AS NEEDED
Status: DISCONTINUED | OUTPATIENT
Start: 2024-01-09 | End: 2024-01-09

## 2024-01-09 RX ORDER — BISMUTH SUBSALICYLATE 262 MG
1 TABLET,CHEWABLE ORAL DAILY
COMMUNITY

## 2024-01-09 RX ORDER — KETOROLAC TROMETHAMINE 30 MG/ML
INJECTION, SOLUTION INTRAMUSCULAR; INTRAVENOUS AS NEEDED
Status: DISCONTINUED | OUTPATIENT
Start: 2024-01-09 | End: 2024-01-09

## 2024-01-09 RX ORDER — SODIUM CHLORIDE 9 MG/ML
100 INJECTION, SOLUTION INTRAVENOUS CONTINUOUS
Status: DISCONTINUED | OUTPATIENT
Start: 2024-01-09 | End: 2024-01-10 | Stop reason: HOSPADM

## 2024-01-09 RX ADMIN — MIDAZOLAM HYDROCHLORIDE 2 MG: 1 INJECTION, SOLUTION INTRAMUSCULAR; INTRAVENOUS at 08:08

## 2024-01-09 RX ADMIN — FENTANYL CITRATE 25 MCG: 50 INJECTION, SOLUTION INTRAMUSCULAR; INTRAVENOUS at 08:23

## 2024-01-09 RX ADMIN — PROPOFOL 300 MCG/KG/MIN: 10 INJECTION, EMULSION INTRAVENOUS at 08:13

## 2024-01-09 RX ADMIN — FENTANYL CITRATE 25 MCG: 50 INJECTION, SOLUTION INTRAMUSCULAR; INTRAVENOUS at 08:26

## 2024-01-09 RX ADMIN — Medication 80 MCG: at 08:39

## 2024-01-09 RX ADMIN — ONDANSETRON 4 MG: 2 INJECTION INTRAMUSCULAR; INTRAVENOUS at 08:27

## 2024-01-09 RX ADMIN — SODIUM CHLORIDE, POTASSIUM CHLORIDE, SODIUM LACTATE AND CALCIUM CHLORIDE 100 ML/HR: 600; 310; 30; 20 INJECTION, SOLUTION INTRAVENOUS at 07:10

## 2024-01-09 RX ADMIN — KETOROLAC TROMETHAMINE 30 MG: 30 INJECTION, SOLUTION INTRAMUSCULAR at 08:27

## 2024-01-09 RX ADMIN — FENTANYL CITRATE 25 MCG: 50 INJECTION, SOLUTION INTRAMUSCULAR; INTRAVENOUS at 08:20

## 2024-01-09 RX ADMIN — PROPOFOL 16 MG: 10 INJECTION, EMULSION INTRAVENOUS at 08:29

## 2024-01-09 RX ADMIN — FENTANYL CITRATE 25 MCG: 50 INJECTION, SOLUTION INTRAMUSCULAR; INTRAVENOUS at 08:14

## 2024-01-09 RX ADMIN — LIDOCAINE HYDROCHLORIDE 40 MG: 20 INJECTION INTRAVENOUS at 08:13

## 2024-01-09 SDOH — HEALTH STABILITY: MENTAL HEALTH: CURRENT SMOKER: 1

## 2024-01-09 ASSESSMENT — PAIN SCALES - GENERAL
PAINLEVEL_OUTOF10: 0 - NO PAIN

## 2024-01-09 ASSESSMENT — COLUMBIA-SUICIDE SEVERITY RATING SCALE - C-SSRS
2. HAVE YOU ACTUALLY HAD ANY THOUGHTS OF KILLING YOURSELF?: NO
6. HAVE YOU EVER DONE ANYTHING, STARTED TO DO ANYTHING, OR PREPARED TO DO ANYTHING TO END YOUR LIFE?: NO
1. IN THE PAST MONTH, HAVE YOU WISHED YOU WERE DEAD OR WISHED YOU COULD GO TO SLEEP AND NOT WAKE UP?: NO

## 2024-01-09 ASSESSMENT — PAIN - FUNCTIONAL ASSESSMENT
PAIN_FUNCTIONAL_ASSESSMENT: 0-10

## 2024-01-09 NOTE — ANESTHESIA PREPROCEDURE EVALUATION
Patient: Rachel Hobbs    Procedure Information       Date/Time: 01/09/24 0800    Procedure: EXCISION TISSUE VAGINA (Vagina )    Location: [facility] / [facility]    Surgeons: Mercedez Perez MD          Vitals:    01/09/24 0636   BP: 113/77   Pulse: 73   Resp: 16   Temp: 36.6 °C (97.9 °F)   SpO2: 100%       Past Surgical History:   Procedure Laterality Date    APPENDECTOMY  11/11/2013    Appendectomy    BREAST SURGERY  05/20/2013    Breast Surgery    COLONOSCOPY  05/21/2019    Complete Colonoscopy    HYSTERECTOMY  05/20/2013    Hysterectomy    OTHER SURGICAL HISTORY  05/20/2013    Nerve Block Transforaminal Epidural    OTHER SURGICAL HISTORY  05/21/2019    Esophagogastroduodenoscopy     Past Medical History:   Diagnosis Date    Acute bronchitis due to other specified organisms 12/03/2015    Acute bronchitis due to other specified organisms    Acute upper respiratory infection, unspecified 12/06/2018    Acute upper respiratory infection    Asymptomatic premature menopause     Premature menopause    Cutaneous abscess of limb, unspecified 04/05/2018    Abscess of leg    Hordeolum externum unspecified eye, unspecified eyelid     Stye    Mastodynia 02/16/2016    Breast pain    Other acute sinusitis 12/23/2017    Other acute sinusitis, recurrence not specified    Other intervertebral disc degeneration, lumbar region     Lumbar degenerative disc disease    Pain in throat 07/13/2020    Throat discomfort    Panic disorder (episodic paroxysmal anxiety) 02/07/2014    Panic disorder without agoraphobia    Personal history of diseases of the skin and subcutaneous tissue 03/14/2016    History of atopic dermatitis    Personal history of other diseases of the circulatory system 07/26/2018    History of lymphadenitis    Personal history of other diseases of the digestive system 03/26/2019    History of acute gastritis    Personal history of other diseases of the respiratory system 06/26/2020    History of acute  pharyngitis    Personal history of other diseases of the respiratory system 03/31/2015    History of acute sinusitis    Personal history of other infectious and parasitic diseases 06/05/2013    History of candidiasis    Restless legs syndrome     Restless legs syndrome    Unspecified contact dermatitis due to other agents 02/21/2022    Contact dermatitis due to other agent    Unspecified convulsions (CMS/HCC)     Seizure    Unspecified lump in unspecified breast 12/12/2014    Breast nodule    Vaginal cyst        Current Facility-Administered Medications:     lactated Ringer's infusion, 100 mL/hr, intravenous, Continuous, Darrian Garland MD, Last Rate: 100 mL/hr at 01/09/24 0710, 100 mL/hr at 01/09/24 0710    sodium chloride 0.9% infusion, 100 mL/hr, intravenous, Continuous, Mercedez Perez MD  Prior to Admission medications    Medication Sig Start Date End Date Taking? Authorizing Provider   coenzyme Q-10 100 mg capsule Take by mouth.   Yes Historical Provider, MD   cyanocobalamin (Vitamin B-12) 1,000 mcg tablet Take 1 tablet (1,000 mcg) by mouth once daily. 12/7/15  Yes Historical Provider, MD   magnesium oxide (Mag-Ox) 400 mg tablet 1 tablet (400 mg) once daily.   Yes Historical Provider, MD   Minivelle 0.0375 mg/24 hr Place 1 patch over 96 hours on the skin 2 times a week. 11/20/23 11/19/24 Yes Carisa Castle MD   multivitamin tablet Take 1 tablet by mouth once daily.   Yes Historical Provider, MD   mv-min-FA-vit K-lutein-zeaxant (PreserVision AREDS 2 Plus MV) 200 mcg-15 mcg- 5 mg-1 mg capsule Take by mouth.   Yes Historical Provider, MD   progesterone 100 % powder USE AS DIRECTED - gets compounded in a cream   Yes Historical Provider, MD   Synthroid 75 mcg tablet TAKE 1 TABLET BY MOUTH EVERY DAY EXCEPT ON TUESDAY AND THURSDAY TAKE 1/2 TABLET 11/17/23  Yes Carisa Castle MD   ALPRAZolam (Xanax) 1 mg tablet Take 1 tablet (1 mg) by mouth as needed at bedtime for anxiety.    Historical  "Provider, MD   etodolac XL (Lodine XL) 400 mg 24 hr tablet Take 1 tablet (400 mg) by mouth once daily as needed (pain). 5/24/23   Carisa Castle MD   fluticasone (Flonase) 50 mcg/actuation nasal spray Administer into affected nostril(s) once daily. 2/2/17   Historical Provider, MD   mupirocin (Bactroban) 2 % ointment Apply 1 Application topically 2 times a day as needed (sores).  1/9/24  Historical Provider, MD   vitamin B12-folic acid 0.5-1 mg tablet Take 1 tablet by mouth once daily. 10/1/00 1/9/24  Historical Provider, MD     Allergies   Allergen Reactions    Other Other    Statins-Hmg-Coa Reductase Inhibitors Other    Tetanus Toxoid Unknown     Social History     Tobacco Use    Smoking status: Every Day     Types: Cigarettes    Smokeless tobacco: Never    Tobacco comments:     3 cigarettes a day, 20 years.   Substance Use Topics    Alcohol use: Yes     Comment: rare         Chemistry    Lab Results   Component Value Date/Time     11/17/2023 0945    K 4.8 11/17/2023 0945     11/17/2023 0945    CO2 29 11/17/2023 0945    BUN 17 11/17/2023 0945    CREATININE 1.01 11/17/2023 0945    Lab Results   Component Value Date/Time    CALCIUM 9.7 11/17/2023 0945    ALKPHOS 52 11/17/2023 0945    AST 21 11/17/2023 0945    ALT 22 11/17/2023 0945    BILITOT 0.5 11/17/2023 0945          Lab Results   Component Value Date/Time    WBC 5.0 11/17/2023 0945    HGB 14.9 11/17/2023 0945    HCT 45.6 11/17/2023 0945     11/17/2023 0945     No results found for: \"PROTIME\", \"PTT\", \"INR\"  No results found for this or any previous visit (from the past 4464 hour(s)).  No results found for this or any previous visit from the past 1095 days.    Relevant Problems   Cardiovascular   (+) Essential familial hyperlipidemia   (+) Hyperlipidemia   (+) Pure hypercholesterolemia      Endocrine   (+) Hypothyroidism      Musculoskeletal   (+) Degenerative disc disease, lumbar   (+) Fibromyalgia   (+) Lumbar herniated disc    "   Infectious Disease   (+) Condyloma acuminatum   (+) Onychomycosis of great toe      Other   (+) Arthritis of great toe at metatarsophalangeal joint       Clinical information reviewed:   Tobacco  Allergies  Meds               NPO Detail:  NPO/Void Status  Date of Last Liquid: 01/09/24  Time of Last Liquid: 0000         Physical Exam    Airway  Mallampati: II     Cardiovascular - normal exam     Dental    Pulmonary    Abdominal            Anesthesia Plan    History of general anesthesia?: yes  History of complications of general anesthesia?: no    ASA 2     general     The patient is a current smoker.  Patient was previously instructed to abstain from smoking on day of procedure.  Patient did not smoke on day of procedure.  Education provided regarding risk of obstructive sleep apnea.  intravenous induction   Anesthetic plan and risks discussed with patient.    Plan discussed with CRNA.

## 2024-01-09 NOTE — H&P
Gynecology Admission History and Physical     Rachel Hobbs is a 58 y.o.  female with dyspareunia exacerbated by pain along a hymenal mass.  Location and tenderness make this difficult to access in office under local anesthetic.  She has optimized pain and stretch with vaginal estrogen replacement but continues to have localized pain with intercourse in this area.  She wishes to proceed with excision of nodule.  R/B/A discussed at length.     Chief Complaint: Dyspareunia, vaginal nodule    Assessment/Plan    57 yo with vaginal wall nodule associated with pain.  R/b/a of excision discussed.  Pt. Wishes to proceed with excision.  -anesthesia consult for anesthetia plan    Active Problems:  There are no active Hospital Problems.      Problem List       No episode was linked to this visit.          Subjective   Rachel is here complaining of pain mostly with intercourse.  Currently comfortable.     Denies fever, cough, SOB     Past Medical History  Past Medical History:   Diagnosis Date    Acute bronchitis due to other specified organisms 12/03/2015    Acute bronchitis due to other specified organisms    Acute upper respiratory infection, unspecified 12/06/2018    Acute upper respiratory infection    Asymptomatic premature menopause     Premature menopause    Cutaneous abscess of limb, unspecified 04/05/2018    Abscess of leg    Hordeolum externum unspecified eye, unspecified eyelid     Stye    Mastodynia 02/16/2016    Breast pain    Other acute sinusitis 12/23/2017    Other acute sinusitis, recurrence not specified    Other intervertebral disc degeneration, lumbar region     Lumbar degenerative disc disease    Pain in throat 07/13/2020    Throat discomfort    Panic disorder (episodic paroxysmal anxiety) 02/07/2014    Panic disorder without agoraphobia    Personal history of diseases of the skin and subcutaneous tissue 03/14/2016    History of atopic dermatitis    Personal history of other diseases of the  circulatory system 07/26/2018    History of lymphadenitis    Personal history of other diseases of the digestive system 03/26/2019    History of acute gastritis    Personal history of other diseases of the respiratory system 06/26/2020    History of acute pharyngitis    Personal history of other diseases of the respiratory system 03/31/2015    History of acute sinusitis    Personal history of other infectious and parasitic diseases 06/05/2013    History of candidiasis    Restless legs syndrome     Restless legs syndrome    Unspecified contact dermatitis due to other agents 02/21/2022    Contact dermatitis due to other agent    Unspecified convulsions (CMS/HCC)     Seizure    Unspecified lump in unspecified breast 12/12/2014    Breast nodule    Vaginal cyst         Past Surgical History   Past Surgical History:   Procedure Laterality Date    APPENDECTOMY  11/11/2013    Appendectomy    BREAST SURGERY  05/20/2013    Breast Surgery    COLONOSCOPY  05/21/2019    Complete Colonoscopy    HYSTERECTOMY  05/20/2013    Hysterectomy    OTHER SURGICAL HISTORY  05/20/2013    Nerve Block Transforaminal Epidural    OTHER SURGICAL HISTORY  05/21/2019    Esophagogastroduodenoscopy       Social History  Social History     Tobacco Use    Smoking status: Every Day     Types: Cigarettes    Smokeless tobacco: Never    Tobacco comments:     3 cigarettes a day, 20 years.   Substance Use Topics    Alcohol use: Yes     Comment: rare     Substance and Sexual Activity   Drug Use Never       Allergies  Other, Statins-hmg-coa reductase inhibitors, and Tetanus toxoid     Medications  Medications Prior to Admission   Medication Sig Dispense Refill Last Dose    coenzyme Q-10 100 mg capsule Take by mouth.   1/8/2024    cyanocobalamin (Vitamin B-12) 1,000 mcg tablet Take 1 tablet (1,000 mcg) by mouth once daily.   1/8/2024    magnesium oxide (Mag-Ox) 400 mg tablet 1 tablet (400 mg) once daily.   1/8/2024    Minivelle 0.0375 mg/24 hr Place 1 patch over  "96 hours on the skin 2 times a week. 24 patch 3 1/9/2024    multivitamin tablet Take 1 tablet by mouth once daily.   1/8/2024    mv-min-FA-vit K-lutein-zeaxant (PreserVision AREDS 2 Plus MV) 200 mcg-15 mcg- 5 mg-1 mg capsule Take by mouth.   1/8/2024    progesterone 100 % powder USE AS DIRECTED - gets compounded in a cream   1/8/2024    Synthroid 75 mcg tablet TAKE 1 TABLET BY MOUTH EVERY DAY EXCEPT ON TUESDAY AND THURSDAY TAKE 1/2 TABLET 90 tablet 3 1/9/2024    ALPRAZolam (Xanax) 1 mg tablet Take 1 tablet (1 mg) by mouth as needed at bedtime for anxiety.   More than a month    etodolac XL (Lodine XL) 400 mg 24 hr tablet Take 1 tablet (400 mg) by mouth once daily as needed (pain). 90 tablet 1 Unknown    fluticasone (Flonase) 50 mcg/actuation nasal spray Administer into affected nostril(s) once daily.   Unknown         Objective    Last Vitals  Temp Pulse Resp BP MAP O2 Sat   36.6 °C (97.9 °F) 73 16 113/77   100 %     Physical Examination  Physical Exam  Constitutional:       General: She is not in acute distress.     Appearance: Normal appearance.   Pulmonary:      Effort: Pulmonary effort is normal.   Abdominal:      General: Bowel sounds are normal.      Palpations: Abdomen is soft.   Musculoskeletal:         General: Normal range of motion.      No edema.   Neurological:      Mental Status: She is alert.   Psychiatric:         Mood and Affect: Mood normal.       Lab Review  No results found for: \"ABO\", \"LABRH\", \"ABSCRN\"  Lab Results   Component Value Date    WBC 5.0 11/17/2023    HGB 14.9 11/17/2023    HCT 45.6 11/17/2023     11/17/2023       "

## 2024-01-09 NOTE — BRIEF OP NOTE
Date: 2024  OR Location: [facility] OR    Name: Rachel Hobbs, : 1965, Age: 58 y.o., MRN: [id number], Sex: female    Diagnosis  Pre-op Diagnosis     * Vaginal cyst [N89.8] Post-op Diagnosis     * Vaginal cyst [N89.8]     Procedures  EXCISION TISSUE VAGINA  80376 - NC EXCISION VAGINAL CYST/TUMOR      Surgeons      * Mercedez Perez - Primary    Resident/Fellow/Other Assistant:  Surgeon(s) and Role:    Procedure Summary  Anesthesia: Consult  ASA: ASA status not filed in the log.  Anesthesia Staff: No anesthesia staff entered.  Estimated Blood Loss: 1mL  Intra-op Medications: * No intraprocedure medications in log *        Specimen: Vaginal wall lesion    Staff:   No surgical staff documented.    Findings: small (7mm) firm nodule at 10 oclock approximately 3 cm from introitus.    Complications:  None; patient tolerated the procedure well.     Disposition: PACU - hemodynamically stable.  Condition: stable  Specimens Collected: No specimens collected  Attending Attestation: I was present and scrubbed for the entire procedure.    Operative Note:  PREOP DIAGNOSIS: symptomatic, painful vaginal mass at 10 oclock  POST OP DIAGNOSIS: Same  SURGEON: Mercedez Perez MD  ASSISTANTS: None  PROCEDURE: excision of vaginal lesion  ANTIBIOTICS: None  EBL: Minimal  COMPLICATIONS: None    After risks and benefits of the procedure was discussed and consent obtained pt. Was taken to OR suite where MAC anesthesia was induced.  She was placed in the dorsal lithotomy position using the sis sirrups.  She was prepped and draped in the usual fashion for a vaginal procedure.    The previously  noted lesion at the 10 oclock position approximately 3 cm from the introitus was identified and grasped with an allis clamp.  It was elevated and excised and sent to pathology.  The vaginal mucosa was re approximated with 1 simple stitch of 3-0 vicryl suture.  Hemostasis was noted.  No other vaginal lesions/masses were identified.    This  completed the procedure.  All sponge and instrument counts were correct.  The pt. Was transferred to  in stable condition.    Mercedez Perez  Phone Number: [phone]

## 2024-01-09 NOTE — H&P
Gynecology Admission History and Physical     Rachel Hobbs is a 58 y.o.  female with dyspareunia exacerbated by pain along a hymenal mass.  Location and tenderness make this difficult to access in office under local anesthetic.  She has optimized pain and stretch with vaginal estrogen replacement but continues to have localized pain with intercourse in this area.  She wishes to proceed with excision of nodule.  R/B/A discussed at length.     Chief Complaint: Dyspareunia, vaginal nodule    Assessment/Plan    59 yo with vaginal wall nodule associated with pain.  R/b/a of excision discussed.  Pt. Wishes to proceed with excision.  -anesthesia consult for anesthetia plan    Active Problems:  There are no active Hospital Problems.      Problem List       No episode was linked to this visit.          Subjective   Rachel is here complaining of pain mostly with intercourse.  Currently comfortable.     Denies fever, cough, SOB     Past Medical History  Past Medical History:   Diagnosis Date    Acute bronchitis due to other specified organisms 12/03/2015    Acute bronchitis due to other specified organisms    Acute upper respiratory infection, unspecified 12/06/2018    Acute upper respiratory infection    Asymptomatic premature menopause     Premature menopause    Cutaneous abscess of limb, unspecified 04/05/2018    Abscess of leg    Hordeolum externum unspecified eye, unspecified eyelid     Stye    Mastodynia 02/16/2016    Breast pain    Other acute sinusitis 12/23/2017    Other acute sinusitis, recurrence not specified    Other intervertebral disc degeneration, lumbar region     Lumbar degenerative disc disease    Pain in throat 07/13/2020    Throat discomfort    Panic disorder (episodic paroxysmal anxiety) 02/07/2014    Panic disorder without agoraphobia    Personal history of diseases of the skin and subcutaneous tissue 03/14/2016    History of atopic dermatitis    Personal history of other diseases of the  circulatory system 07/26/2018    History of lymphadenitis    Personal history of other diseases of the digestive system 03/26/2019    History of acute gastritis    Personal history of other diseases of the respiratory system 06/26/2020    History of acute pharyngitis    Personal history of other diseases of the respiratory system 03/31/2015    History of acute sinusitis    Personal history of other infectious and parasitic diseases 06/05/2013    History of candidiasis    Restless legs syndrome     Restless legs syndrome    Unspecified contact dermatitis due to other agents 02/21/2022    Contact dermatitis due to other agent    Unspecified convulsions (CMS/HCC)     Seizure    Unspecified lump in unspecified breast 12/12/2014    Breast nodule    Vaginal cyst         Past Surgical History   Past Surgical History:   Procedure Laterality Date    APPENDECTOMY  11/11/2013    Appendectomy    BREAST SURGERY  05/20/2013    Breast Surgery    COLONOSCOPY  05/21/2019    Complete Colonoscopy    HYSTERECTOMY  05/20/2013    Hysterectomy    OTHER SURGICAL HISTORY  05/20/2013    Nerve Block Transforaminal Epidural    OTHER SURGICAL HISTORY  05/21/2019    Esophagogastroduodenoscopy       Social History  Social History     Tobacco Use    Smoking status: Never    Smokeless tobacco: Never   Substance Use Topics    Alcohol use: Never     Substance and Sexual Activity   Drug Use Never       Allergies  Other, Statins-hmg-coa reductase inhibitors, and Tetanus toxoid     Medications  (Not in a hospital admission)      Objective    Last Vitals  Temp Pulse Resp BP MAP O2 Sat                   Physical Examination  Physical Exam  Constitutional:       General: She is not in acute distress.     Appearance: Normal appearance.   Pulmonary:      Effort: Pulmonary effort is normal.   Abdominal:      General: Bowel sounds are normal.      Palpations: Abdomen is soft.   Musculoskeletal:         General: Normal range of motion.      No edema.  "  Neurological:      Mental Status: She is alert.   Psychiatric:         Mood and Affect: Mood normal.       Lab Review  No results found for: \"ABO\", \"LABRH\", \"ABSCRN\"  Lab Results   Component Value Date    WBC 5.0 11/17/2023    HGB 14.9 11/17/2023    HCT 45.6 11/17/2023     11/17/2023       "

## 2024-01-09 NOTE — ANESTHESIA POSTPROCEDURE EVALUATION
Patient: Rachel Hobbs    Procedure Summary       Date: 01/09/24 Room / Location: [facility] / [facility]    Anesthesia Start: 0808 Anesthesia Stop: 0842    Procedure: EXCISION TISSUE VAGINA (Vagina ) Diagnosis:       Vaginal cyst      (vaginal cyst)    Surgeons: Mercedez Perez MD Responsible Provider: GISELA Haas    Anesthesia Type: general ASA Status: 2            Anesthesia Type: general    Vitals Value Taken Time   /59 01/09/24 0932   Temp 36.2 °C (97.2 °F) 01/09/24 0832   Pulse 54 01/09/24 0936   Resp 14 01/09/24 0932   SpO2 100 % 01/09/24 0934   Vitals shown include unvalidated device data.    Anesthesia Post Evaluation    Patient location during evaluation: PACU  Patient participation: complete - patient participated  Level of consciousness: awake  Pain management: adequate  Multimodal analgesia pain management approach  Airway patency: patent  Two or more strategies used to mitigate risk of obstructive sleep apnea  Cardiovascular status: acceptable  Respiratory status: acceptable  Hydration status: acceptable  Postoperative Nausea and Vomiting: none        No notable events documented.

## 2024-01-16 LAB
LABORATORY COMMENT REPORT: NORMAL
PATH REPORT.FINAL DX SPEC: NORMAL
PATH REPORT.GROSS SPEC: NORMAL
PATH REPORT.RELEVANT HX SPEC: NORMAL
PATH REPORT.TOTAL CANCER: NORMAL

## 2024-01-19 DIAGNOSIS — J20.9 ACUTE BRONCHITIS, UNSPECIFIED ORGANISM: Primary | ICD-10-CM

## 2024-01-19 RX ORDER — AZITHROMYCIN 250 MG/1
TABLET, FILM COATED ORAL
Qty: 6 TABLET | Refills: 0 | Status: SHIPPED | OUTPATIENT
Start: 2024-01-19 | End: 2024-01-24

## 2024-02-15 ENCOUNTER — OFFICE VISIT (OUTPATIENT)
Dept: PRIMARY CARE | Facility: CLINIC | Age: 59
End: 2024-02-15
Payer: COMMERCIAL

## 2024-02-15 VITALS
BODY MASS INDEX: 28.09 KG/M2 | OXYGEN SATURATION: 98 % | TEMPERATURE: 98.5 F | WEIGHT: 168.8 LBS | HEART RATE: 80 BPM | DIASTOLIC BLOOD PRESSURE: 74 MMHG | SYSTOLIC BLOOD PRESSURE: 122 MMHG

## 2024-02-15 DIAGNOSIS — J01.90 ACUTE SINUSITIS, RECURRENCE NOT SPECIFIED, UNSPECIFIED LOCATION: Primary | ICD-10-CM

## 2024-02-15 PROCEDURE — 99213 OFFICE O/P EST LOW 20 MIN: CPT | Performed by: FAMILY MEDICINE

## 2024-02-15 RX ORDER — AMOXICILLIN AND CLAVULANATE POTASSIUM 875; 125 MG/1; MG/1
875 TABLET, FILM COATED ORAL 2 TIMES DAILY
Qty: 20 TABLET | Refills: 0 | Status: SHIPPED | OUTPATIENT
Start: 2024-02-15 | End: 2024-02-25

## 2024-02-15 NOTE — PATIENT INSTRUCTIONS
TREATMENT FOR SINUSITIS AND UPPER RESPIRATORY INFECTIONS:     Drink plenty of fluids, especially water.     Used humidifiers, steam, hot liquids to moisten your nasal passages.      Saline nasal spray often helps,  Simply Saline is a nice over the counter saline spray that you can use as much as you want.     IF DIRECTED TO DO SO:    You can use Afrin nasal spray for the first 3 days  ONLY , after that, it will make you worse, not better. DO NOT USE IN CHILDREN UNDER 6 YEARS OF AGE OR IF YOU HAVE ANY HYPERTENTION OR HEART ISSUES.      Mucinex or guaifenesin is an over the counter medication that often helps loosen the mucous.  DO NOT USE IN CHILDREN UNDER 6 YEARS OF AGE.      Please be sure to call or follow-up if you are not better in 5-10 days, or if you are worsening.      The most common cause of upper respiratory infections are viruses - which no not need an antibiotic to get better.   We want your own immune system to fight the virus so you or your child develop immunity to it.    However,  people can develop pneumonia, sinus infections and sometimes ear infections from a virus  - which may need an antibiotic.   So if you are showing signs of breathing issues,  or severe ear pain or facial pain with fevers, of if you are no better after 10 days , its important that you contact us.        If you are prescribed an antibiotic,  it's a good idea to take a probiotic to help prevent diarrhea and yeast infections.  This would be eating a yogurt daily or taking something like acidophillus or Culturelle.        EDUCATION ABOUT TAKING ANTIBIOTICS:     It is very important to complete the entire course of antibiotics as directed.  This helps prevent antibiotic resistant forms of bacteria.     You may want to create a chart, and gaston off the doses taken to remember them all.     Common side effects of antibiotics include yeast infections, diarrhea and nausea. Sometimes over-the-counter probiotics (such as eating yogurt or  taking acidophilus or Culturelle)  may help prevent the diarrhea and yeast infections caused by antibiotics. If you develop persistent or bloody diarrhea after taking an antibiotic, please contact your provider about the possibility of a serious secondary infection of your colon caused by the antibiotic. Sometimes the nausea from antibiotics can be helped by taking the antibiotic with food unless otherwise specified not to by the pharmacist.     If you develop a rash while on the antibiotic, if could be from the antibiotic, from the illness itself, or could be from a response by some viral infections to the antibiotic. Please discuss this with your provider before assuming that you are allergic to the medication.    If it is determined that you have had an allergic reaction to the antibiotic, please make sure you make note of that for yourself to be sure to never get that antibiotic again as a more serious reaction - called anaphylaxis - may occur.   You should also ask about similar antibiotics that may be dangerous as well.    If you are a woman on birth control, it is important you use a back up form of contraception for the next month to prevent pregnancy as some antibiotics reduce the effectiveness of birth control. This could result in an unplanned pregnancy.

## 2024-02-15 NOTE — PROGRESS NOTES
Subjective   Patient ID: Rachel Hobbs is a 58 y.o. female who presents for Sinusitis (Cough, congestion ).    HPI       Message from January - Flu sx 4 days - then severe bronchitis - she called and I sent in a Zpak       Still with sinus drainage ,   Chest feels tight, mucous stuck in the throat  Fatigue   All still lingers    Headache this AM    No fever    Taking allegra, mucinex and flonase   Lots of steam   Lots of water     Review of Systems    Objective   /74   Pulse 80   Temp 36.9 °C (98.5 °F)   Wt 76.6 kg (168 lb 12.8 oz)   SpO2 98%   BMI 28.09 kg/m²     Physical Exam  Vitals reviewed.   Constitutional:       Appearance: Normal appearance. She is normal weight.   HENT:      Head: Normocephalic and atraumatic.      Nose: Congestion and rhinorrhea present.      Mouth/Throat:      Mouth: Mucous membranes are moist.      Pharynx: Oropharynx is clear. No oropharyngeal exudate or posterior oropharyngeal erythema.   Eyes:      Conjunctiva/sclera: Conjunctivae normal.      Pupils: Pupils are equal, round, and reactive to light.   Cardiovascular:      Rate and Rhythm: Normal rate and regular rhythm.   Pulmonary:      Effort: Pulmonary effort is normal.      Breath sounds: Normal breath sounds.   Musculoskeletal:      Cervical back: No tenderness.   Neurological:      Mental Status: She is alert.         Assessment/Plan   Problem List Items Addressed This Visit    None  Visit Diagnoses         Codes    Acute sinusitis, recurrence not specified, unspecified location    -  Primary J01.90    Relevant Medications    amoxicillin-pot clavulanate (Augmentin) 875-125 mg tablet          We discussed at visit any disease processes that were of concern as well as the risks, benefits and instructions of any new medication provided.    See orders and discussion section for information handed to patient on their Clinical Summary.   Patient (and/or caretaker of patient if present)  stated all questions were answered,  and they voiced understanding of instructions.

## 2024-02-26 DIAGNOSIS — Z79.890 HORMONE REPLACEMENT THERAPY: Primary | ICD-10-CM

## 2024-02-26 RX ORDER — PROGESTERONE, MICRONIZED 100 %
POWDER (GRAM) MISCELLANEOUS
Qty: 1000 G | Refills: 3 | Status: SHIPPED | OUTPATIENT
Start: 2024-02-26

## 2024-03-04 DIAGNOSIS — L30.9 DERMATITIS: Primary | ICD-10-CM

## 2024-03-05 RX ORDER — CLOBETASOL PROPIONATE 0.46 MG/ML
SOLUTION TOPICAL
Qty: 50 ML | Refills: 0 | Status: SHIPPED | OUTPATIENT
Start: 2024-03-05

## 2024-03-22 ENCOUNTER — OFFICE VISIT (OUTPATIENT)
Dept: DERMATOLOGY | Facility: CLINIC | Age: 59
End: 2024-03-22
Payer: COMMERCIAL

## 2024-03-22 DIAGNOSIS — L81.4 LENTIGO: ICD-10-CM

## 2024-03-22 DIAGNOSIS — D18.01 ANGIOMA OF SKIN: Primary | ICD-10-CM

## 2024-03-22 DIAGNOSIS — L82.1 SEBORRHEIC KERATOSIS: ICD-10-CM

## 2024-03-22 DIAGNOSIS — D22.9 BENIGN NEVUS: ICD-10-CM

## 2024-03-22 DIAGNOSIS — L57.9 SKIN CHANGES DUE TO CHRONIC EXPOSURE TO NONIONIZING RADIATION: ICD-10-CM

## 2024-03-22 PROCEDURE — 99203 OFFICE O/P NEW LOW 30 MIN: CPT | Performed by: NURSE PRACTITIONER

## 2024-03-22 NOTE — PROGRESS NOTES
Subjective     Rachel Hobbs is a 59 y.o. female who presents for the following: Suspicious Skin Lesion (back).     Review of Systems:  No other skin or systemic complaints other than what is documented elsewhere in the note.    The following portions of the chart were reviewed this encounter and updated as appropriate:          Skin Cancer History  No skin cancer on file.      Specialty Problems          Dermatology Problems    Disorder of sebaceous glands    Dermatitis    Onychomycosis of great toe        Objective   Well appearing patient in no apparent distress; mood and affect are within normal limits.    A full examination was performed including scalp, head, eyes, ears, nose, lips, neck, chest, axillae, abdomen, back, buttocks, bilateral upper extremities, bilateral lower extremities, hands, feet, fingers, toes, fingernails, and toenails. All findings within normal limits unless otherwise noted below.      Assessment/Plan   1. Angioma of skin  Scattered cherry-red papule(s).    A cherry hemangioma is a small macule (small, flat, smooth area) or papule (small, solid bump) formed from an overgrowth of tiny blood vessels in the skin. Cherry hemangiomas are characteristically red or purplish in color. They often first appear in middle adulthood and usually increase in number with age. Cherry hemangiomas are noncancerous (benign) and are common in adults.    The present appearance of the lesion is not worrisome but it should continue to be observed and testing/treatment may be warranted if change occurs.    2. Benign nevus  Scattered, uniform and benign-appearing, regular brown melanocytic papules and macules.    The present appearance of the lesion is not worrisome but it should continue to be observed and testing/treatment may be warranted if change occurs.    3. Seborrheic keratosis  Stuck on verrucous, tan-brown papules and plaques.    Mid upper back slightly excoriated flesh toned papule      Seborrheic  keratoses are common noncancerous (benign) growths of unknown cause seen in adults due to a thickening of an area of the top skin layer. Seborrheic keratoses may appear as if they are stuck on to the skin. They have distinct borders, and they may appear as papules (small, solid bumps) or plaques (solid, raised patches that are bigger than a thumbnail). They may be the same color as your skin, or they may be pink, light brown, darker brown, or very dark brown, or sometimes may appear black.    There is no way to prevent new seborrheic keratoses from forming. Seborrheic keratoses can be removed, but removal is considered a cosmetic issue and is usually not covered by insurance.    PLAN  No treatment is needed unless there is irritation from clothing, such as itching or bleeding.  2.   Some lotions containing alpha hydroxy acids, salicylic acid, or urea may make the areas feel smoother with regular use but will not eliminate them.    Reassured patient regarding lesion of concern on the mid upper back.     4. Lentigo  Scattered tan macules in sun-exposed areas.    A solar lentigo (plural, solar lentigines), also known as a sun-induced freckle or senile lentigo, is a dark (hyperpigmented) lesion caused by natural or artificial ultraviolet (UV) light. Solar lentigines may be single or multiple. This type of lentigo is different from a simple lentigo (lentigo simplex) because it is caused by exposure to UV light. Solar lentigines are benign, but they do indicate excessive sun exposure, a risk factor for the development of skin cancer.    To prevent solar lentigines, avoid exposure to sunlight in midday (10 AM to 3 PM), wear sun-protective clothing (tightly woven clothes and hats), and apply sunscreen (SPF 30 UVA and UVB block).    The present appearance of the lesion is not worrisome but it should continue to be observed and testing/treatment may be warranted if change occurs.    5. Skin changes due to chronic exposure to  nonionizing radiation  Actinic changes in the form of freckles, lentigines and hyper/hypopigmentation     ABCDEs of melanoma and atypical moles were discussed with the patient.    Patient was instructed to perform monthly self skin examination.  We recommended that the patient have regular full skin exams given an increased risk of subsequent skin cancers.    The patient was instructed to use sun protective behaviors including use of broad spectrum sunscreens and sun protective clothing to reduce risk of skin cancers.    Warning signs of non-melanoma skin cancer discussed.        Return to clinic in 1-2 years for skin check/follow up or sooner if needed

## 2024-03-22 NOTE — PATIENT INSTRUCTIONS

## 2024-05-18 ENCOUNTER — PATIENT MESSAGE (OUTPATIENT)
Dept: PRIMARY CARE | Facility: CLINIC | Age: 59
End: 2024-05-18
Payer: COMMERCIAL

## 2024-05-18 DIAGNOSIS — Z79.890 HORMONE REPLACEMENT THERAPY: Primary | ICD-10-CM

## 2024-05-19 RX ORDER — ESTRADIOL 0.05 MG/D
FILM, EXTENDED RELEASE TRANSDERMAL
Qty: 4 PATCH | Refills: 11 | Status: SHIPPED | OUTPATIENT
Start: 2024-05-19

## 2024-05-22 ENCOUNTER — OFFICE VISIT (OUTPATIENT)
Dept: PRIMARY CARE | Facility: CLINIC | Age: 59
End: 2024-05-22
Payer: COMMERCIAL

## 2024-05-22 VITALS
BODY MASS INDEX: 27.96 KG/M2 | OXYGEN SATURATION: 98 % | DIASTOLIC BLOOD PRESSURE: 84 MMHG | WEIGHT: 168 LBS | SYSTOLIC BLOOD PRESSURE: 118 MMHG | HEART RATE: 81 BPM

## 2024-05-22 DIAGNOSIS — E55.9 VITAMIN D DEFICIENCY: ICD-10-CM

## 2024-05-22 DIAGNOSIS — Z12.31 SCREENING MAMMOGRAM, ENCOUNTER FOR: ICD-10-CM

## 2024-05-22 DIAGNOSIS — G47.00 INSOMNIA, UNSPECIFIED TYPE: ICD-10-CM

## 2024-05-22 DIAGNOSIS — Z78.0 POSTMENOPAUSAL ESTROGEN DEFICIENCY: ICD-10-CM

## 2024-05-22 DIAGNOSIS — Z01.419 ENCOUNTER FOR ANNUAL ROUTINE GYNECOLOGICAL EXAMINATION: Primary | ICD-10-CM

## 2024-05-22 DIAGNOSIS — E78.49 ESSENTIAL FAMILIAL HYPERLIPIDEMIA: ICD-10-CM

## 2024-05-22 DIAGNOSIS — E53.8 VITAMIN B12 DEFICIENCY: ICD-10-CM

## 2024-05-22 LAB
CHOLEST SERPL-MCNC: 314 MG/DL (ref 0–199)
CHOLESTEROL/HDL RATIO: 5.4
HDLC SERPL-MCNC: 58.1 MG/DL
LDLC SERPL CALC-MCNC: 232 MG/DL
NON HDL CHOLESTEROL: 256 MG/DL (ref 0–149)
TRIGL SERPL-MCNC: 120 MG/DL (ref 0–149)
VLDL: 24 MG/DL (ref 0–40)

## 2024-05-22 PROCEDURE — 99396 PREV VISIT EST AGE 40-64: CPT | Performed by: FAMILY MEDICINE

## 2024-05-22 PROCEDURE — 82607 VITAMIN B-12: CPT

## 2024-05-22 PROCEDURE — 82306 VITAMIN D 25 HYDROXY: CPT

## 2024-05-22 PROCEDURE — 36415 COLL VENOUS BLD VENIPUNCTURE: CPT

## 2024-05-22 PROCEDURE — 80061 LIPID PANEL: CPT

## 2024-05-22 RX ORDER — ALPRAZOLAM 1 MG/1
1 TABLET ORAL NIGHTLY PRN
Qty: 7 TABLET | Refills: 0 | Status: SHIPPED | OUTPATIENT
Start: 2024-05-22

## 2024-05-22 NOTE — PATIENT INSTRUCTIONS
Please bring in your living will and medical poa papers     IF you should decide to try to wean off the estrogen - let me know to send in lowering doses -   If hot flashes and irritability are high - can try venlafaxine       WELL VISIT/PREVENTATIVE VISIT INSTRUCTIONS:        Call [phone] to schedule any testing ordered at [facility].      For a mammogram, call   [phone] .    Please work on healthy nutrition,  optimal sleep, and regular exercise to feel better.    If you smoke - please quit, and if you drink alcohol, please limit your intake.       Be sure to ask me about any vaccines you may be due for,  and ask me any questions you may have about vaccines.   Generally -  a flu shot is recommended every fall for everyone over 6 months of age,  a pneumonia shot is recommended for anyone 65 and over or who has chronic conditions such as diabetes, heart or lung disease,  the shingles vaccines are recommended for people age 50 and over,   a Tetnas/Pertussis booster is very 10 years (after the childhood set);  the RSV vaccine is for people age 65 and over,  and the COVID vaccines are recommended for everyone, but the boosters are often particular people, so ask me if you qualify.      There may be more vaccines you qualify for depending on your medical conditions, so be sure to ask me about that if you have any chronic illnesses.    Some people have insurance that will pay for the vaccines at the pharmacy, and some your doctors office - so be sure to check with your insurance about the best place to get your vaccines and your coverage of them.     Generally speaking,  good nutrition consists of lean meats, like chicken, fish and turkey (not fried);    plenty of fruits and vegetables (the fresher the better);   Less sugars, saturated fats, and processed foods - especially those made with white flour.   Try to eat the majority of your grain foods  as whole grains.   Keep an eye on your total calories in a  "day and serving sizes on packaging - this will help you limit your overall intake.    Remember, to lose weight (if you need to), your total calorie intake has to be about 300 - 500 calories less than what you burn in a day.   That number depends on your age, gender and body size.   Some people find a fitbit (calorie tracking device) helpful.      Please be sure to see the dentist every 6 months.    Please see the eye doctor no longer than every 2 years.    When you have your Living Will and Medical Power of  papers completed   (they have to be witnessed by 2 non-relatives or notarized to be legally binding),     please keep your originals in a safe place in your home, but make sure all your physicians have copies and that you take copies with you when you go to the hospital.        GETTING TEST RESULTS:    YOU WILL ALWAYS FIND OUT ABOUT ALL YOUR TEST RESULTS FROM ME.  I do not use the \"no news is good news\" policy.   The only time you would not, is if I have told you to get the testing done, and make a follow up appointment to go over it.    Then I will be waiting to talk to you at the visit about your test results.     I have a few different ways I get test results to you  (if its something urgent, we call you)  :       If you have a [employer] card -  I will have your test results on your secureach card within a week.  You should get a phone call telling you when the results are on the card, or just call the card in a week.   If two weeks go  by and you have not heard anything, call the card to see if the results are there,  and if not,  leave me a message.  If you are having trouble using [email or web address], they have a support line you should call :   [phone];   If you have lost your card, I need to know.     IT'S VERY IMPORTANT THAT YOU CALL TO LISTEN TO YOUR RESULTS, AS IT THEN LETS ME KNOW YOU GOT YOUR RESULTS.        If you get your test results on MY CHART,  you may commonly see your results even " "before I do.      I will always annotate a message on your results so you know that I saw them and how I feel about them.     If you need some help with MY CHART call the support number at [phone].    IF I mail your results to you,   I need to hear from you if you do not receive them within 2 weeks.      Be sure to let me know your preference for getting results.         BILLING FOR PREVENTATIVE VISITS.     Most insurance companies pay for a yearly \"Wellness Check\"  or they may call it a \"Preventative Physical\"   - but it's important to know what your plan covers ahead of the visit.    It's also a good idea to find out what sort of preventative testing they will cover for screening tests - like cholesterol, blood sugar, or colonoscopies. Also, find out which vaccines are covered and if you have any responsibility in paying for them.       If at your Wellness Visit,  we cover anything to do with problems/issues  you are having or  chronic medications/ medical conditions you have,   there will ALSO be a regular office charge that day.     Blood work  or testing obtained that has anything to do with an acute issue or chronic condition is billed under that diagnosis and not screening.       It's a good idea to find out from your insurance what is covered and what is your responsibility for all of the above possible charges.           Most importantly,   if you ever have any questions or concerns that cannot wait until your next visit with me,  you can message me through MY CHART or call the office and leave a voice mail message  (please allow for at least 24 hours for responses for these messages).       Take good care.   Dr Hubbard          "

## 2024-05-22 NOTE — PROGRESS NOTES
Subjective   Patient ID: Rachel Hobbs is a 59 y.o. female who presents for Annual Exam (WWE.).    HPI       LOV 6 mos ago     Stress is high -   Still dealing with her parents     Finds herself tired mid-day - naps help     Fasted today  - wants to check on chol   Knows it runs high -   Intol of statins and repatha     Would like to check Cor CT again   Last one 2016       Cannot get the 0.0375 Minivelle -   So we ordered 0.05 - on that now       [facility] -      Last WWE - 2023      Reviewed WWE paper today      Last pap - NONE NEEDED - S/P HYST - BUT ON HRT ;  Pelvic exam  -  polyp seen -   Sent to [facility] -  bx done - told benign     (Need GC/CT screen?) - low risk     LMP - NONE SINCE HYST - AGE 37 - COMPLETE HYST DUE TO ENDOMETRIOSIS - HRT SINCE THEN  problems? Still painful intercourse - very dry   AVOIDS - very tight pelvic muscles      HRT - see above   PROGESTERONE CREAM THIGHS -     VAGINAL ESTROGEN DID NOT HELP.     2021- tried the  minivelle 0.025 - found herself way too irritable - feels much better back on 0.0375  (CANNOT HAVE THE WEEKLY ESTROGEN PATCH - CLIMERA or Estradiol - REACTED TO both of them )     We have discussed risks of HRT therapy reglarly - she voices understanding and wishes to continue despite risk.          - 1  Para - 1     Last mammogram - 2023 - MAMM WNL   Breast issues? - NONE CURRENTLY     Bone density - OSTEOPENIA; ON VIT D; has calcium in her diet   Last DEXA 2016 (STABLE FROM 10 YEARS EARLIER)  - willing to check again      Last colonoscopy - 2019 - Colonoscopy - had polyp removed - good for 5 years   COLONOSCOPY IS SCHEDULED      Last cholesterol - VERY HIGH CHOLESTEROL - INTOL OF STATINS, ZETIA AND REPATHA -      Last blood sugar - 2023  - WNL      Hep C screen? -   NEG 2022   HIV screen? - LOW RISK     vaccines -   ALLERGIC TO TETNAS,   DOES NOT GET FLU SHOTS;   SHINGLES - NOT SURE   DID GET COVID VACCINES x 2 and boosterS   RSV - NOT YET       vision - THERE RECENTLY      dentist - DOES GO REGULARLY     BMI/weight - 27      nutrition - DOING WELL; tried no gluten - no help      exercise - REGULAR       smoking status - FORMER SMOKER - QUIT about (2013) - smoked 3 - 6 cig a day for 25 years   Need Screening Lung CT? Does not qualify      alcohol consumption - RARE     Need coronary calcium score? - ZERO IN 2016 (that was second scan)         Living Will and Medical POA papers. - DONE - BUT DID NOT BRING IN           CHRONIC ISSUES REVIEWED TODAY :      HRT -   see above    Hypothyroid:    Synthroid 75 one daily - 1/2 Tues and Thurs ;   Not missing doses  Takes in the AM  -   Takes it alone  -  20 min later has black coffee      Familial Hyperlipidemia - (LDL over 300) - HAS NOT BEEN ABLE TO TOLERATE THE STATINS, ZETIA or Repatha   Cor Score Zero x 2 2011 and 2016         Fibromyalgia and Diffuse DDD - diffuse chronic pain   Spinal surgery with DR Terrell at [facility] on July 9th 2019 - 2 spurs were removed and found ligaments pressing against a nerve - took away stabbing pains   Did not feel well on Gabapentin   Meds - Etodolac  mg prn - needs it when she over does it      Pain is always there at about 3/10       Stretches 3 - 4 times a week           Takes alprazolam  1 mg -     1/4 tab to help sleep when has severe pain           (Has 0 left) -  would like #10 - that lasts 3 - 5 years      insomnia -sleeps well the first few hours , then wakes up and thinks   (Trazodone did not help)         RLS - tried supplement - did not help much         Vit D Def -   found a D she can take     On B12 too          Gets sores in the pubic area occas - mupirocin helps      AR - stays on flonase        vaccines -   ALLERGIC TO TETNAS,   DOES NOT GET FLU SHOTS;   SHINGLES - NOT SURE   DID GET COVID VACCINES x 2 and boosters      Not the new one         Review of Systems    Objective   /84 (BP Location: Right arm, Patient Position: Sitting, BP Cuff Size: Adult)    Pulse 81   Wt 76.2 kg (168 lb)   SpO2 98%   BMI 27.96 kg/m²     Physical Exam  Constitutional:       Appearance: Normal appearance.   HENT:      Head: Normocephalic and atraumatic.      Right Ear: Tympanic membrane, ear canal and external ear normal.      Left Ear: Tympanic membrane, ear canal and external ear normal.      Nose: Nose normal.      Mouth/Throat:      Mouth: Mucous membranes are moist.      Pharynx: No posterior oropharyngeal erythema.   Eyes:      General: No scleral icterus.     Extraocular Movements: Extraocular movements intact.      Pupils: Pupils are equal, round, and reactive to light.   Cardiovascular:      Rate and Rhythm: Normal rate and regular rhythm.      Pulses: Normal pulses.      Heart sounds: Normal heart sounds. No murmur heard.  Pulmonary:      Effort: Pulmonary effort is normal. No respiratory distress.      Breath sounds: Normal breath sounds. No wheezing.   Chest:      Chest wall: No deformity.   Breasts:     Laron Score is 5.      Breasts are symmetrical.      Right: Normal. No mass.      Left: Normal. No mass.   Abdominal:      General: Bowel sounds are normal. There is no distension.      Palpations: Abdomen is soft.      Tenderness: There is no abdominal tenderness.   Genitourinary:     General: Normal vulva.      Exam position: Lithotomy position.      Pubic Area: No rash.       Labia:         Right: No lesion.         Left: No lesion.       Vagina: Normal. No vaginal discharge.      Adnexa: Right adnexa normal and left adnexa normal.   Musculoskeletal:         General: Normal range of motion.      Cervical back: Neck supple. No rigidity.      Right lower leg: No edema.      Left lower leg: No edema.   Lymphadenopathy:      Cervical: No cervical adenopathy.      Upper Body:      Right upper body: No axillary adenopathy.      Left upper body: No axillary adenopathy.   Skin:     General: Skin is warm and dry.      Findings: No rash.   Neurological:      General: No focal  deficit present.      Mental Status: She is alert and oriented to person, place, and time.   Psychiatric:         Mood and Affect: Mood normal.         Thought Content: Thought content normal.         Assessment/Plan   Problem List Items Addressed This Visit             ICD-10-CM       Medium    Essential familial hyperlipidemia E78.49    Relevant Orders    CT cardiac scoring wo IV contrast    Lipid Panel    Insomnia G47.00    Relevant Medications    ALPRAZolam (Xanax) 1 mg tablet    Vitamin B12 deficiency E53.8    Relevant Orders    Vitamin B12    Vitamin D deficiency E55.9    Relevant Orders    Vitamin D 25-Hydroxy,Total (for eval of Vitamin D levels)    Screening mammogram, encounter for Z12.31    Relevant Orders    BI mammo bilateral screening tomosynthesis    Encounter for annual routine gynecological examination - Primary Z01.419     Other Visit Diagnoses         Codes    Postmenopausal estrogen deficiency     Z78.0    Relevant Orders    XR DEXA bone density          Discussed trying to wean off estrogen again   She will consider -     Interested in checking lipids and Cor CT again     We discussed at visit any disease processes that were of concern as well as the risks, benefits and instructions of any new medication provided.    See orders and discussion section for information handed to patient on their Clinical Summary.   Patient (and/or caretaker of patient if present)  stated all questions were answered, and they voiced understanding of instructions.

## 2024-05-23 LAB
25(OH)D3 SERPL-MCNC: 32 NG/ML (ref 30–100)
VIT B12 SERPL-MCNC: 523 PG/ML (ref 211–911)

## 2024-06-10 ENCOUNTER — HOSPITAL ENCOUNTER (OUTPATIENT)
Dept: RADIOLOGY | Facility: HOSPITAL | Age: 59
Discharge: HOME | End: 2024-06-10
Payer: COMMERCIAL

## 2024-06-10 VITALS — WEIGHT: 165 LBS | HEIGHT: 66 IN | BODY MASS INDEX: 26.52 KG/M2

## 2024-06-10 DIAGNOSIS — Z12.31 SCREENING MAMMOGRAM, ENCOUNTER FOR: ICD-10-CM

## 2024-06-10 DIAGNOSIS — Z78.0 POSTMENOPAUSAL ESTROGEN DEFICIENCY: ICD-10-CM

## 2024-06-10 PROCEDURE — 77080 DXA BONE DENSITY AXIAL: CPT | Performed by: STUDENT IN AN ORGANIZED HEALTH CARE EDUCATION/TRAINING PROGRAM

## 2024-06-10 PROCEDURE — 77080 DXA BONE DENSITY AXIAL: CPT

## 2024-06-10 PROCEDURE — 77063 BREAST TOMOSYNTHESIS BI: CPT | Performed by: RADIOLOGY

## 2024-06-10 PROCEDURE — 77067 SCR MAMMO BI INCL CAD: CPT | Performed by: RADIOLOGY

## 2024-06-10 PROCEDURE — 77063 BREAST TOMOSYNTHESIS BI: CPT

## 2024-07-04 ENCOUNTER — PATIENT MESSAGE (OUTPATIENT)
Dept: PRIMARY CARE | Facility: CLINIC | Age: 59
End: 2024-07-04
Payer: COMMERCIAL

## 2024-07-04 DIAGNOSIS — E28.39 ESTROGEN DEFICIENCY: Primary | ICD-10-CM

## 2024-07-05 RX ORDER — ESTRADIOL 0.04 MG/D
1 PATCH, EXTENDED RELEASE TRANSDERMAL 2 TIMES WEEKLY
Qty: 24 PATCH | Refills: 3 | Status: SHIPPED | OUTPATIENT
Start: 2024-07-08 | End: 2025-07-08

## 2024-08-13 ENCOUNTER — OFFICE VISIT (OUTPATIENT)
Dept: PRIMARY CARE | Facility: CLINIC | Age: 59
End: 2024-08-13
Payer: COMMERCIAL

## 2024-08-13 VITALS
OXYGEN SATURATION: 98 % | DIASTOLIC BLOOD PRESSURE: 80 MMHG | SYSTOLIC BLOOD PRESSURE: 126 MMHG | HEART RATE: 66 BPM | BODY MASS INDEX: 27.41 KG/M2 | WEIGHT: 169.8 LBS

## 2024-08-13 DIAGNOSIS — M25.50 ARTHRALGIA OF MULTIPLE JOINTS: ICD-10-CM

## 2024-08-13 DIAGNOSIS — M79.7 FIBROMYALGIA: ICD-10-CM

## 2024-08-13 DIAGNOSIS — F41.9 ANXIETY: ICD-10-CM

## 2024-08-13 DIAGNOSIS — M54.12 CERVICAL RADICULITIS: Primary | ICD-10-CM

## 2024-08-13 PROCEDURE — 99214 OFFICE O/P EST MOD 30 MIN: CPT | Performed by: FAMILY MEDICINE

## 2024-08-13 RX ORDER — PREDNISONE 10 MG/1
TABLET ORAL
Qty: 30 TABLET | Refills: 0 | Status: SHIPPED | OUTPATIENT
Start: 2024-08-13 | End: 2024-08-25

## 2024-08-13 RX ORDER — ESCITALOPRAM OXALATE 5 MG/1
5 TABLET ORAL DAILY
Qty: 30 TABLET | Refills: 2 | Status: SHIPPED | OUTPATIENT
Start: 2024-08-13 | End: 2024-11-11

## 2024-08-13 NOTE — PATIENT INSTRUCTIONS
Lets try a prednisone taper.     Coming to radiology   9 - 12  or 1:30 - 4 is safest  You may want to call them first to be sure they are up and running -   [phone]   Or get them at the hospital       Appt with DR Saleh.   Physiatrist.         Lets try you on escitalopram  5 mg a day -   If you are tolerating it well -  and its not helping much after 2 weeks - then try 10 mg a day .      Keep in touch with me about what this is doing for you.

## 2024-08-13 NOTE — PROGRESS NOTES
"Subjective   Patient ID: Rachel Hobbs is a 59 y.o. female who presents for Pain (follow).    HPI     Acute appt today -   She states she is having a very hard time with her pain     Chronic pain issues - but now in her shoulder blades and her neck , more on the right -   Pain is constant  - she cannot find a way to relieve it     Doing lots of stretches     Numbness on feet getting worse     She brought a list of all the symptoms that are getting worse -   Numbeness in left foot  - tingling , sometimes effects standing and balance   - both feet hurt when standing in one spot   - pinching - \"like little shocks\" in waist and hip area    - fatigue - napping   - stiffness all over, mostly neck and shoulders     - constipation    - feelings of bugs crawling up calf    - back pain and ache   - Hard to focus and concentrate    - there are times when all symptoms are quiet     Her alprazopam helps her feel better or a Rum and Coke  - she will take these only occas for relief -   And never together     Stress is very high -   Trying to care for her parents that are very difficult    From my last note in May -   \" Fibromyalgia and Diffuse DDD - diffuse chronic pain   Spinal surgery with DR Terrell at [facility] on July 9th 2019 - 2 spurs were removed and found ligaments pressing against a nerve - took away stabbing pains   Did not feel well on Gabapentin   Meds - Etodolac  mg prn - needs it when she over does it      Pain is always there at about 3/10       Stretches 3 - 4 times a week           Takes alprazolam  1 mg -     1/4 tab to help sleep when has severe pain           (Has 0 left) -  would like #10 - that lasts 3 - 5 years \"      Objective   /80   Pulse 66   Wt 77 kg (169 lb 12.8 oz)   SpO2 98%   BMI 27.41 kg/m²     Physical Exam  Vitals reviewed.   Constitutional:       General: She is not in acute distress.     Appearance: Normal appearance.   HENT:      Head: Normocephalic and atraumatic.      Nose: " Nose normal.      Mouth/Throat:      Mouth: Mucous membranes are moist.      Pharynx: No posterior oropharyngeal erythema.   Eyes:      Extraocular Movements: Extraocular movements intact.      Conjunctiva/sclera: Conjunctivae normal.      Pupils: Pupils are equal, round, and reactive to light.   Cardiovascular:      Rate and Rhythm: Normal rate and regular rhythm.      Heart sounds: Normal heart sounds. No murmur heard.  Pulmonary:      Effort: Pulmonary effort is normal. No respiratory distress.      Breath sounds: Normal breath sounds. No wheezing.   Musculoskeletal:      Cervical back: Tenderness (Very tender right paracervical region) present. No rigidity.      Comments: Very tender medial to scapula    Lymphadenopathy:      Cervical: No cervical adenopathy.   Skin:     General: Skin is warm and dry.      Findings: No rash.   Neurological:      General: No focal deficit present.      Mental Status: She is alert. Mental status is at baseline.   Psychiatric:         Mood and Affect: Mood normal.         Thought Content: Thought content normal.         Assessment/Plan   Problem List Items Addressed This Visit             ICD-10-CM       Medium    Arthralgia of multiple joints M25.50    Relevant Orders    Referral to Physical Medicine Rehab    Fibromyalgia M79.7     Other Visit Diagnoses         Codes    Cervical radiculitis    -  Primary M54.12    Relevant Medications    predniSONE (Deltasone) 10 mg tablet    Other Relevant Orders    Referral to Physical Medicine Rehab    Anxiety     F41.9    Relevant Medications    escitalopram (Lexapro) 5 mg tablet          We discussed today that anxiety is likely compounding her issues - lonnie since the alprazolam and alcohol help  -   Urged her to stop that -   And lets try anti-anxiety med like escitalopram -  will need a very low dose at first as she is very sensitive to meds     Sounds like she is having a cervical radicular issues -   Try pred taper   She states muscle  relaxers have not helped in the past     Order for C-spine xray     And her chronic pain has been a long time issue -   Refer to DR Saleh     She agreed to plan .     Will keep in touch on My Chart to let me know how she is doing on meds     We discussed at visit any disease processes that were of concern as well as the risks, benefits and instructions of any new medication provided.    See orders and discussion section for information handed to patient on their Clinical Summary.   Patient (and/or caretaker of patient if present)  stated all questions were answered, and they voiced understanding of instructions.

## 2024-08-14 ENCOUNTER — HOSPITAL ENCOUNTER (OUTPATIENT)
Dept: RADIOLOGY | Facility: HOSPITAL | Age: 59
Discharge: HOME | End: 2024-08-14
Payer: COMMERCIAL

## 2024-08-14 DIAGNOSIS — M54.12 CERVICAL RADICULITIS: ICD-10-CM

## 2024-08-14 DIAGNOSIS — M25.50 ARTHRALGIA OF MULTIPLE JOINTS: ICD-10-CM

## 2024-08-14 PROCEDURE — 72050 X-RAY EXAM NECK SPINE 4/5VWS: CPT

## 2024-08-14 PROCEDURE — 72050 X-RAY EXAM NECK SPINE 4/5VWS: CPT | Performed by: RADIOLOGY

## 2024-08-20 DIAGNOSIS — H00.19 CHALAZION, UNSPECIFIED LATERALITY: Primary | ICD-10-CM

## 2024-08-20 RX ORDER — ERYTHROMYCIN 5 MG/G
OINTMENT OPHTHALMIC EVERY 6 HOURS
Qty: 3.5 G | Refills: 0 | Status: SHIPPED | OUTPATIENT
Start: 2024-08-20 | End: 2024-08-30

## 2024-08-21 ENCOUNTER — PATIENT MESSAGE (OUTPATIENT)
Dept: PRIMARY CARE | Facility: CLINIC | Age: 59
End: 2024-08-21
Payer: COMMERCIAL

## 2024-08-21 DIAGNOSIS — I73.9 CLAUDICATION (CMS-HCC): Primary | ICD-10-CM

## 2024-08-22 ENCOUNTER — HOSPITAL ENCOUNTER (OUTPATIENT)
Dept: RADIOLOGY | Facility: HOSPITAL | Age: 59
Discharge: HOME | End: 2024-08-22
Payer: COMMERCIAL

## 2024-08-22 DIAGNOSIS — E78.49 ESSENTIAL FAMILIAL HYPERLIPIDEMIA: ICD-10-CM

## 2024-08-22 PROCEDURE — 75571 CT HRT W/O DYE W/CA TEST: CPT

## 2024-08-27 DIAGNOSIS — G62.9 NEUROPATHY: ICD-10-CM

## 2024-08-27 DIAGNOSIS — M51.26 LUMBAR HERNIATED DISC: Primary | ICD-10-CM

## 2024-09-05 ENCOUNTER — HOSPITAL ENCOUNTER (OUTPATIENT)
Dept: VASCULAR MEDICINE | Facility: HOSPITAL | Age: 59
Discharge: HOME | End: 2024-09-05
Payer: COMMERCIAL

## 2024-09-05 DIAGNOSIS — I73.9 CLAUDICATION (CMS-HCC): ICD-10-CM

## 2024-09-05 PROCEDURE — 93924 LWR XTR VASC STDY BILAT: CPT

## 2024-09-05 PROCEDURE — 93924 LWR XTR VASC STDY BILAT: CPT | Performed by: SURGERY

## 2024-09-08 ENCOUNTER — HOSPITAL ENCOUNTER (OUTPATIENT)
Dept: RADIOLOGY | Facility: HOSPITAL | Age: 59
Discharge: HOME | End: 2024-09-08
Payer: COMMERCIAL

## 2024-09-08 DIAGNOSIS — G62.9 NEUROPATHY: ICD-10-CM

## 2024-09-08 DIAGNOSIS — M51.26 LUMBAR HERNIATED DISC: ICD-10-CM

## 2024-09-08 PROCEDURE — 72148 MRI LUMBAR SPINE W/O DYE: CPT

## 2024-09-08 PROCEDURE — 72148 MRI LUMBAR SPINE W/O DYE: CPT | Performed by: RADIOLOGY

## 2024-09-10 DIAGNOSIS — G57.93 NEUROPATHY OF BOTH FEET: ICD-10-CM

## 2024-09-10 DIAGNOSIS — M51.36 DEGENERATIVE DISC DISEASE, LUMBAR: Primary | ICD-10-CM

## 2024-10-03 PROBLEM — M25.569 KNEE PAIN: Status: ACTIVE | Noted: 2024-10-03

## 2024-10-03 PROBLEM — M79.7 FIBROMYOSITIS: Status: ACTIVE | Noted: 2024-10-03

## 2024-10-03 PROBLEM — E78.01 FAMILIAL HYPERCHOLESTEROLEMIA: Status: ACTIVE | Noted: 2024-10-03

## 2024-10-03 PROBLEM — R00.2 PALPITATIONS: Status: ACTIVE | Noted: 2024-10-03

## 2024-10-03 PROBLEM — M75.100 TEAR OF ROTATOR CUFF: Status: ACTIVE | Noted: 2024-10-03

## 2024-10-03 PROBLEM — R56.9 SEIZURE (MULTI): Status: ACTIVE | Noted: 2024-10-03

## 2024-10-03 PROBLEM — E66.3 OVERWEIGHT WITH BODY MASS INDEX (BMI) 25.0-29.9: Status: ACTIVE | Noted: 2024-10-03

## 2024-10-03 RX ORDER — SOD SULF/POT CHLORIDE/MAG SULF 1.479 G
TABLET ORAL
COMMUNITY
Start: 2024-05-10

## 2024-10-04 NOTE — PROGRESS NOTES
"Chief Complaint:   Rachel Hobbs is a 59 y.o. woman here for lumbar degenerative disc disease    HPI  NPV - Referred by Dr. Carisa Serrano for worsening degeneration in lower spine. Pt notes having lower back pain with radiating tinging to her feet. Notes pinching sensation at times in waist and hip area. Has tried Gabapentin that she was unable to tolerate. Pt takes alprazolam and Etodolac as needed. She notes a history of Spinal surgery with Dr. Terrell at [facility] on July 9th 2019 - \"2 spurs were removed and found ligaments pressing against a nerve\" - took away stabbing pains. Has been referred to Dr. Saleh and Dr. Rob. MRI L-Spine done 9/8/24.    Her low back pain is chronic, she has burning with numbness and tingling in her feet and this is what bothers her the most. She has tried karla in the past for low back pain, but not for her pain in her feet. She stopped this years ago. She feels the rest of her pain is severe and pressing on any area in her body hurts. She has done acupuncture. She did pain management before her lumbar surgery, but not recently. She also did PT years ago, but not recently.     Review of Systems  All other systems reviewed and negative other than what is already stated in this note.      Past Medical History:   Diagnosis Date    Acute bronchitis due to other specified organisms 12/03/2015    Acute bronchitis due to other specified organisms    Acute upper respiratory infection, unspecified 12/06/2018    Acute upper respiratory infection    Asymptomatic premature menopause     Premature menopause    Cutaneous abscess of limb, unspecified 04/05/2018    Abscess of leg    Hordeolum externum unspecified eye, unspecified eyelid     Stye    Mastodynia 02/16/2016    Breast pain    Other acute sinusitis 12/23/2017    Other acute sinusitis, recurrence not specified    Other intervertebral disc degeneration, lumbar region     Lumbar degenerative disc disease    Pain in throat 07/13/2020 "    Throat discomfort    Panic disorder (episodic paroxysmal anxiety) 02/07/2014    Panic disorder without agoraphobia    Personal history of diseases of the skin and subcutaneous tissue 03/14/2016    History of atopic dermatitis    Personal history of other diseases of the circulatory system 07/26/2018    History of lymphadenitis    Personal history of other diseases of the digestive system 03/26/2019    History of acute gastritis    Personal history of other diseases of the respiratory system 06/26/2020    History of acute pharyngitis    Personal history of other diseases of the respiratory system 03/31/2015    History of acute sinusitis    Personal history of other infectious and parasitic diseases 06/05/2013    History of candidiasis    Restless legs syndrome     Restless legs syndrome    Unspecified contact dermatitis due to other agents 02/21/2022    Contact dermatitis due to other agent    Unspecified convulsions (Multi)     Seizure    Unspecified lump in unspecified breast 12/12/2014    Breast nodule    Vaginal cyst        Patient Active Problem List   Diagnosis    Allergic rhinitis    Arthralgia of multiple joints    Plantar fasciitis    Arthritis of great toe at metatarsophalangeal joint    Cervical strain    Condyloma acuminatum    Dry eye syndrome of left eye    Episcleritis of left eye    Essential familial hyperlipidemia    Estrogen deficiency    Fatigue    Fibroadenosis of breast    Fibromyalgia    Hyperlipidemia    Hypothyroidism    Insomnia    Knee pain, right    Lumbar herniated disc    Menopausal symptoms    Neuropathy    Onychomycosis of great toe    Osteopenia    Restless leg syndrome    Vitamin B12 deficiency    Vitamin D deficiency    Vulvar atrophy    Hormone replacement therapy    Screening mammogram, encounter for    Dermatitis    Encounter for annual routine gynecological examination    Vaginal polyp    Degenerative disc disease, lumbar    Disorder of sebaceous glands    Low back pain     Malaise and fatigue    Polyarthropathy    Pure hypercholesterolemia    Knee pain    Familial hypercholesterolemia    Fibromyositis    Overweight with body mass index (BMI) 25.0-29.9    Palpitations    Seizure (Multi)    Tear of rotator cuff       Past Surgical History:   Procedure Laterality Date    APPENDECTOMY  11/11/2013    Appendectomy    BREAST BIOPSY Right 1995    benign excisional bx    BREAST CYST ASPIRATION Right 1996    benign    BREAST SURGERY  05/20/2013    Breast Surgery    COLONOSCOPY  05/21/2019    Complete Colonoscopy    HYSTERECTOMY  05/20/2013    Hysterectomy    OTHER SURGICAL HISTORY  05/20/2013    Nerve Block Transforaminal Epidural    OTHER SURGICAL HISTORY  05/21/2019    Esophagogastroduodenoscopy       No family history on file.    Social History     Tobacco Use    Smoking status: Former     Types: Cigarettes    Smokeless tobacco: Never    Tobacco comments:     3 cigarettes a day, 20 years.   Substance Use Topics    Alcohol use: Yes     Comment: rare    Drug use: Never         Current Outpatient Medications:     ALPRAZolam (Xanax) 1 mg tablet, Take 1 tablet (1 mg) by mouth as needed at bedtime for anxiety., Disp: 7 tablet, Rfl: 0    aspirin 81 mg capsule, Take 81 mg by mouth once daily., Disp: , Rfl:     clobetasol (Temovate) 0.05 % external solution, APPLY AND GENTLY MASSAGE INTO AFFECTED AREA(S) ONCE DAILY AS NEEDED, Disp: 50 mL, Rfl: 0    coenzyme Q-10 100 mg capsule, Take by mouth., Disp: , Rfl:     cyanocobalamin (Vitamin B-12) 1,000 mcg tablet, Take 1 tablet (1,000 mcg) by mouth once daily., Disp: , Rfl:     etodolac XL (Lodine XL) 400 mg 24 hr tablet, Take 1 tablet (400 mg) by mouth once daily as needed (pain)., Disp: 90 tablet, Rfl: 1    fluticasone (Flonase) 50 mcg/actuation nasal spray, Administer into affected nostril(s) once daily., Disp: , Rfl:     magnesium oxide (Mag-Ox) 400 mg tablet, 1 tablet (400 mg) once daily., Disp: , Rfl:     multivitamin tablet, Take 1 tablet by mouth  once daily., Disp: , Rfl:     mv-min-FA-vit K-lutein-zeaxant (PreserVision AREDS 2 Plus MV) 200 mcg-15 mcg- 5 mg-1 mg capsule, Take by mouth., Disp: , Rfl:     progesterone micronized 100 % powder powder, Apply 0.2mL topically once daily., Disp: 1000 g, Rfl: 3    Sutab 1.479-0.188- 0.225 gram tablet, AS DIRECTED BY MOUTH PRIOR TO COLONOSCOPY, Disp: , Rfl:     Synthroid 75 mcg tablet, TAKE 1 TABLET BY MOUTH EVERY DAY EXCEPT ON TUESDAY AND THURSDAY TAKE 1/2 TABLET, Disp: 90 tablet, Rfl: 3    Vivelle-Dot 0.0375 mg/24 hr, Place 1 patch over 96 hours on the skin 2 times a week., Disp: 24 patch, Rfl: 3    escitalopram (Lexapro) 5 mg tablet, Take 1 tablet (5 mg) by mouth once daily. (Patient not taking: Reported on 10/8/2024), Disp: 30 tablet, Rfl: 2    gabapentin (Neurontin) 100 mg capsule, Take 3 capsules (300 mg) by mouth 3 times a day., Disp: 270 capsule, Rfl: 1        Objective   Vitals:    10/08/24 0833   BP: 140/80   Pulse: 67   Temp: 36.5 °C (97.7 °F)       no acute distress, well developed woman appearing her  stated age  normal sclera  moist mucus membranes  no peripheral edema   symmetric chest rise  nondistended abdomen  alert and oriented, pupils equal and round, extraocular movements intact, effort limited exam, but grossly full strength in all extremities, normal sensation to light touch throughout, normal symmetric reflexes, no dysmetria, diffuse tenderness   normal mood      Assessment/Plan   I personally reviewed MRI of the lumbar spine done on 9/8/2024 which shows degenerative disc disease at the L5-S1 level with laminectomy defect at this level.  There is no significant neural compression.  Her symptoms are consistent with fibromyalgia and I referred her to rheumatology.  The burning and tingling in her feet which is what bothers her the most is concerning for peripheral neuropathy and I also referred her to neurology for further evaluation for this.  She does not need any neurosurgical intervention at  this time.  I also prescribed gabapentin for this peripheral neuropathy pain, which she tolerated in the past well.    Aj Gutierrez MD

## 2024-10-08 ENCOUNTER — OFFICE VISIT (OUTPATIENT)
Dept: NEUROSURGERY | Facility: CLINIC | Age: 59
End: 2024-10-08
Payer: COMMERCIAL

## 2024-10-08 VITALS
TEMPERATURE: 97.7 F | DIASTOLIC BLOOD PRESSURE: 80 MMHG | HEART RATE: 67 BPM | SYSTOLIC BLOOD PRESSURE: 140 MMHG | BODY MASS INDEX: 27 KG/M2 | WEIGHT: 168 LBS | HEIGHT: 66 IN

## 2024-10-08 DIAGNOSIS — G57.93 NEUROPATHY OF BOTH FEET: Primary | ICD-10-CM

## 2024-10-08 DIAGNOSIS — M51.369 DEGENERATIVE DISC DISEASE, LUMBAR: ICD-10-CM

## 2024-10-08 DIAGNOSIS — M79.7 FIBROMYALGIA: ICD-10-CM

## 2024-10-08 PROCEDURE — 99215 OFFICE O/P EST HI 40 MIN: CPT | Performed by: NEUROLOGICAL SURGERY

## 2024-10-08 PROCEDURE — 3008F BODY MASS INDEX DOCD: CPT | Performed by: NEUROLOGICAL SURGERY

## 2024-10-08 PROCEDURE — 99205 OFFICE O/P NEW HI 60 MIN: CPT | Performed by: NEUROLOGICAL SURGERY

## 2024-10-08 RX ORDER — GABAPENTIN 100 MG/1
300 CAPSULE ORAL 3 TIMES DAILY
Qty: 270 CAPSULE | Refills: 1 | Status: SHIPPED | OUTPATIENT
Start: 2024-10-08 | End: 2025-01-06

## 2024-10-08 RX ORDER — GABAPENTIN 300 MG/1
300 CAPSULE ORAL 2 TIMES DAILY
Qty: 90 CAPSULE | Refills: 1 | Status: CANCELLED | OUTPATIENT
Start: 2024-10-08 | End: 2025-01-06

## 2024-10-08 ASSESSMENT — PAIN SCALES - GENERAL: PAINLEVEL: 4

## 2024-10-09 DIAGNOSIS — G62.9 NEUROPATHY: Primary | ICD-10-CM

## 2024-10-15 ENCOUNTER — CLINICAL SUPPORT (OUTPATIENT)
Dept: PRIMARY CARE | Facility: CLINIC | Age: 59
End: 2024-10-15

## 2024-10-15 DIAGNOSIS — R39.9 UTI SYMPTOMS: ICD-10-CM

## 2024-10-15 DIAGNOSIS — R39.9 UTI SYMPTOMS: Primary | ICD-10-CM

## 2024-10-15 LAB
POC APPEARANCE, URINE: CLEAR
POC BILIRUBIN, URINE: NEGATIVE
POC BLOOD, URINE: NEGATIVE
POC COLOR, URINE: ABNORMAL
POC GLUCOSE, URINE: NEGATIVE MG/DL
POC KETONES, URINE: ABNORMAL MG/DL
POC LEUKOCYTES, URINE: NEGATIVE
POC NITRITE,URINE: NEGATIVE
POC PH, URINE: 6 PH
POC PROTEIN, URINE: NEGATIVE MG/DL
POC SPECIFIC GRAVITY, URINE: >=1.03
POC UROBILINOGEN, URINE: 0.2 EU/DL

## 2024-10-15 PROCEDURE — 81003 URINALYSIS AUTO W/O SCOPE: CPT | Performed by: FAMILY MEDICINE

## 2024-10-15 PROCEDURE — 99211 OFF/OP EST MAY X REQ PHY/QHP: CPT | Performed by: FAMILY MEDICINE

## 2024-10-15 PROCEDURE — 87086 URINE CULTURE/COLONY COUNT: CPT

## 2024-10-15 RX ORDER — AMOXICILLIN AND CLAVULANATE POTASSIUM 500; 125 MG/1; MG/1
500 TABLET, FILM COATED ORAL 2 TIMES DAILY
Qty: 20 TABLET | Refills: 0 | Status: SHIPPED | OUTPATIENT
Start: 2024-10-15 | End: 2024-10-25

## 2024-10-17 LAB — BACTERIA UR CULT: NORMAL

## 2024-10-24 ENCOUNTER — HOSPITAL ENCOUNTER (OUTPATIENT)
Dept: RADIOLOGY | Facility: HOSPITAL | Age: 59
Discharge: HOME | End: 2024-10-24
Payer: COMMERCIAL

## 2024-10-24 DIAGNOSIS — G62.9 NEUROPATHY: ICD-10-CM

## 2024-10-24 PROCEDURE — 2550000001 HC RX 255 CONTRASTS: Performed by: FAMILY MEDICINE

## 2024-10-24 PROCEDURE — A9575 INJ GADOTERATE MEGLUMI 0.1ML: HCPCS | Performed by: FAMILY MEDICINE

## 2024-10-24 PROCEDURE — 70553 MRI BRAIN STEM W/O & W/DYE: CPT

## 2024-10-24 RX ORDER — GADOTERATE MEGLUMINE 376.9 MG/ML
14 INJECTION INTRAVENOUS
Status: COMPLETED | OUTPATIENT
Start: 2024-10-24 | End: 2024-10-24

## 2024-10-29 ENCOUNTER — OFFICE VISIT (OUTPATIENT)
Dept: PRIMARY CARE | Facility: CLINIC | Age: 59
End: 2024-10-29
Payer: COMMERCIAL

## 2024-10-29 VITALS
DIASTOLIC BLOOD PRESSURE: 70 MMHG | OXYGEN SATURATION: 97 % | HEART RATE: 71 BPM | WEIGHT: 175.4 LBS | BODY MASS INDEX: 28.31 KG/M2 | SYSTOLIC BLOOD PRESSURE: 122 MMHG

## 2024-10-29 DIAGNOSIS — B37.31 VAGINAL CANDIDIASIS: Primary | ICD-10-CM

## 2024-10-29 PROCEDURE — 99212 OFFICE O/P EST SF 10 MIN: CPT | Performed by: FAMILY MEDICINE

## 2024-10-29 RX ORDER — TERCONAZOLE 8 MG/G
1 CREAM VAGINAL NIGHTLY
Qty: 20 G | Refills: 0 | Status: SHIPPED | OUTPATIENT
Start: 2024-10-29 | End: 2024-11-01

## 2024-10-31 ENCOUNTER — APPOINTMENT (OUTPATIENT)
Dept: PHYSICAL MEDICINE AND REHAB | Facility: CLINIC | Age: 59
End: 2024-10-31
Payer: COMMERCIAL

## 2024-10-31 VITALS
HEIGHT: 66 IN | OXYGEN SATURATION: 98 % | WEIGHT: 172 LBS | TEMPERATURE: 97.3 F | DIASTOLIC BLOOD PRESSURE: 75 MMHG | HEART RATE: 74 BPM | BODY MASS INDEX: 27.64 KG/M2 | SYSTOLIC BLOOD PRESSURE: 116 MMHG

## 2024-10-31 DIAGNOSIS — M54.50 CHRONIC BILATERAL LOW BACK PAIN WITHOUT SCIATICA: ICD-10-CM

## 2024-10-31 DIAGNOSIS — M25.50 ARTHRALGIA OF MULTIPLE JOINTS: ICD-10-CM

## 2024-10-31 DIAGNOSIS — G62.9 NEUROPATHY: ICD-10-CM

## 2024-10-31 DIAGNOSIS — G89.29 CHRONIC BILATERAL LOW BACK PAIN WITHOUT SCIATICA: ICD-10-CM

## 2024-10-31 DIAGNOSIS — M79.7 FIBROMYALGIA: Primary | ICD-10-CM

## 2024-10-31 DIAGNOSIS — G25.81 RESTLESS LEG SYNDROME: ICD-10-CM

## 2024-10-31 DIAGNOSIS — M54.12 CERVICAL RADICULITIS: ICD-10-CM

## 2024-10-31 PROCEDURE — 99205 OFFICE O/P NEW HI 60 MIN: CPT | Performed by: PHYSICAL MEDICINE & REHABILITATION

## 2024-10-31 RX ORDER — ROPINIROLE 0.25 MG/1
TABLET, FILM COATED ORAL
Qty: 252 TABLET | Refills: 0 | Status: SHIPPED | OUTPATIENT
Start: 2024-10-31 | End: 2025-01-06

## 2024-10-31 RX ORDER — GABAPENTIN 100 MG/1
CAPSULE ORAL
Qty: 333 CAPSULE | Refills: 0 | Status: SHIPPED | OUTPATIENT
Start: 2024-10-31 | End: 2024-12-14

## 2024-10-31 ASSESSMENT — PAIN SCALES - GENERAL: PAINLEVEL_OUTOF10: 7

## 2024-11-22 ENCOUNTER — APPOINTMENT (OUTPATIENT)
Dept: PHYSICAL MEDICINE AND REHAB | Facility: CLINIC | Age: 59
End: 2024-11-22
Payer: COMMERCIAL

## 2024-11-22 ENCOUNTER — PATIENT MESSAGE (OUTPATIENT)
Dept: PRIMARY CARE | Facility: CLINIC | Age: 59
End: 2024-11-22

## 2024-11-22 DIAGNOSIS — B37.31 VAGINAL CANDIDIASIS: ICD-10-CM

## 2024-11-22 RX ORDER — TERCONAZOLE 8 MG/G
1 CREAM VAGINAL NIGHTLY
Qty: 20 G | Refills: 0 | Status: SHIPPED | OUTPATIENT
Start: 2024-11-22 | End: 2024-11-25

## 2024-11-25 ENCOUNTER — APPOINTMENT (OUTPATIENT)
Dept: PRIMARY CARE | Facility: CLINIC | Age: 59
End: 2024-11-25
Payer: COMMERCIAL

## 2024-11-25 VITALS
HEART RATE: 64 BPM | SYSTOLIC BLOOD PRESSURE: 104 MMHG | WEIGHT: 177 LBS | OXYGEN SATURATION: 98 % | BODY MASS INDEX: 28.57 KG/M2 | DIASTOLIC BLOOD PRESSURE: 68 MMHG

## 2024-11-25 DIAGNOSIS — E03.9 HYPOTHYROIDISM, UNSPECIFIED TYPE: ICD-10-CM

## 2024-11-25 DIAGNOSIS — F32.A ANXIETY AND DEPRESSION: ICD-10-CM

## 2024-11-25 DIAGNOSIS — F41.9 ANXIETY AND DEPRESSION: ICD-10-CM

## 2024-11-25 DIAGNOSIS — M54.14 THORACIC RADICULOPATHY: ICD-10-CM

## 2024-11-25 DIAGNOSIS — E55.9 VITAMIN D DEFICIENCY: ICD-10-CM

## 2024-11-25 DIAGNOSIS — G37.9 DEMYELINATING CHANGES IN BRAIN (MULTI): ICD-10-CM

## 2024-11-25 DIAGNOSIS — G62.89 OTHER POLYNEUROPATHY: ICD-10-CM

## 2024-11-25 DIAGNOSIS — E53.8 VITAMIN B12 DEFICIENCY: ICD-10-CM

## 2024-11-25 DIAGNOSIS — M54.12 CERVICAL RADICULOPATHY: Primary | ICD-10-CM

## 2024-11-25 LAB
25(OH)D3 SERPL-MCNC: 49 NG/ML (ref 30–100)
ALBUMIN SERPL BCP-MCNC: 4.6 G/DL (ref 3.4–5)
ALP SERPL-CCNC: 52 U/L (ref 33–136)
ALT SERPL W P-5'-P-CCNC: 24 U/L (ref 7–45)
ANION GAP SERPL CALC-SCNC: 13 MMOL/L (ref 10–20)
AST SERPL W P-5'-P-CCNC: 21 U/L (ref 9–39)
BASOPHILS # BLD AUTO: 0.04 X10*3/UL (ref 0–0.1)
BASOPHILS NFR BLD AUTO: 0.9 %
BILIRUB SERPL-MCNC: 0.5 MG/DL (ref 0–1.2)
BUN SERPL-MCNC: 17 MG/DL (ref 6–23)
CALCIUM SERPL-MCNC: 9.6 MG/DL (ref 8.6–10.3)
CHLORIDE SERPL-SCNC: 104 MMOL/L (ref 98–107)
CO2 SERPL-SCNC: 28 MMOL/L (ref 21–32)
CREAT SERPL-MCNC: 0.92 MG/DL (ref 0.5–1.05)
EGFRCR SERPLBLD CKD-EPI 2021: 71 ML/MIN/1.73M*2
EOSINOPHIL # BLD AUTO: 0.14 X10*3/UL (ref 0–0.7)
EOSINOPHIL NFR BLD AUTO: 3.2 %
ERYTHROCYTE [DISTWIDTH] IN BLOOD BY AUTOMATED COUNT: 13.2 % (ref 11.5–14.5)
GLUCOSE SERPL-MCNC: 96 MG/DL (ref 74–99)
HCT VFR BLD AUTO: 46.3 % (ref 36–46)
HGB BLD-MCNC: 14.6 G/DL (ref 12–16)
IMM GRANULOCYTES # BLD AUTO: 0.01 X10*3/UL (ref 0–0.7)
IMM GRANULOCYTES NFR BLD AUTO: 0.2 % (ref 0–0.9)
LYMPHOCYTES # BLD AUTO: 1.68 X10*3/UL (ref 1.2–4.8)
LYMPHOCYTES NFR BLD AUTO: 38.1 %
MAGNESIUM SERPL-MCNC: 2.64 MG/DL (ref 1.6–2.4)
MCH RBC QN AUTO: 29.4 PG (ref 26–34)
MCHC RBC AUTO-ENTMCNC: 31.5 G/DL (ref 32–36)
MCV RBC AUTO: 93 FL (ref 80–100)
MONOCYTES # BLD AUTO: 0.33 X10*3/UL (ref 0.1–1)
MONOCYTES NFR BLD AUTO: 7.5 %
NEUTROPHILS # BLD AUTO: 2.21 X10*3/UL (ref 1.2–7.7)
NEUTROPHILS NFR BLD AUTO: 50.1 %
NRBC BLD-RTO: 0 /100 WBCS (ref 0–0)
PLATELET # BLD AUTO: 244 X10*3/UL (ref 150–450)
POTASSIUM SERPL-SCNC: 5.2 MMOL/L (ref 3.5–5.3)
PROT SERPL-MCNC: 6.8 G/DL (ref 6.4–8.2)
RBC # BLD AUTO: 4.97 X10*6/UL (ref 4–5.2)
SODIUM SERPL-SCNC: 140 MMOL/L (ref 136–145)
TSH SERPL-ACNC: 1 MIU/L (ref 0.44–3.98)
VIT B12 SERPL-MCNC: 856 PG/ML (ref 211–911)
WBC # BLD AUTO: 4.4 X10*3/UL (ref 4.4–11.3)

## 2024-11-25 PROCEDURE — 82306 VITAMIN D 25 HYDROXY: CPT

## 2024-11-25 PROCEDURE — 85025 COMPLETE CBC W/AUTO DIFF WBC: CPT

## 2024-11-25 PROCEDURE — 80053 COMPREHEN METABOLIC PANEL: CPT

## 2024-11-25 PROCEDURE — 82607 VITAMIN B-12: CPT

## 2024-11-25 PROCEDURE — 99215 OFFICE O/P EST HI 40 MIN: CPT | Performed by: FAMILY MEDICINE

## 2024-11-25 PROCEDURE — 84443 ASSAY THYROID STIM HORMONE: CPT

## 2024-11-25 PROCEDURE — 83735 ASSAY OF MAGNESIUM: CPT

## 2024-11-25 NOTE — PROGRESS NOTES
"Subjective   Patient ID: Rachel Hobbs is a 59 y.o. female who presents for Follow-up.    HPI     LOV for Wellness was May 2024     Had 2 primary acute appts -   One for her worsening pain and   One for a yeast infection    For the pain  -   We ended up getting an MRI of the brain and lumbar spine.      MRI of the brain had 2 small lesions that could represent demeylinating process or sequelae of migraine.     MRI of Lspine  -   IMPRESSION:  1. S1 is a transitional type partially lumbarized vertebra with an  enlarged transverse process that has a sclerotic articulation with  the sacrum. A fully formed disc lies between S1 and the remaining  sacrum.      2. Degenerative changes at L4-5 and L5-S1 are present as noted with  mild worsening of degenerative disc changes at L4-5.    Saw DR Gutierrez 10/8/24 - he did not recommend surgery - felt sx were largely fibromyalgia, referred to Rheumatology  and Neuro for the peripheral neuropathy  - RX gabapentin       Pt saw DR Saleh 10/31/24 -   She felt exam consistent with \"idiopathic neuropathy vs small fiber neuropathy\" , fibromyalgia and underlying depression, also felt a consult with Neuro would be a good idea.   Recommended the gabapentin, and ropinirole for RLS   Ordered Labs , EMG, aqua therapy ,   Cervical and thoracid MRI's in the future , possible trigger point injections, or spinal cord stimulator       As of today:   Future appts with DR Saleh are cancelled on her chart.   Labs that she ordered have not been done.        She figured out she was taking collagen and peptides  -   When she stopped that - the yeast infections improved!     Tried Gabapentin - she got severe indigestion  Thinks it might have been the brand   Tried the ropinirole and could not tolerate that either  -   She was getting severe headaches.     Did not try the escitalopram  (thinking about it)       Still having a lot pain in the upper right shoulder  -   Stretching and exercises have not " helped.    Her neck is chronically painful, issues with sleep every night .  There is a large knot in her muscles directly medial to her right scapula that if too much pressure is put on it - she cannot breath.   She did get online to look up the traditional physical therapy exercises for this issue - does this every night and has gotten minimal relief.   Does get tingling her her fingers.       Cognition is still a big problem - issues with memory and word finding.    Pain, numbness, tingling, burning in the feet have been terrible.      Loses balance often.       Does not have appt with Neuro yet.           CHRONIC ISSUES REVIEWED TODAY :      HRT - no change      Hypothyroid:    Synthroid 75 one daily - 1/2 Tues and Thurs ;   Not missing doses  Takes in the AM  -   Takes it alone  -  20 min later has black coffee      Familial Hyperlipidemia - (LDL over 300) - HAS NOT BEEN ABLE TO TOLERATE THE STATINS, ZETIA or Repatha   Cor Score Zero x 2 2011 and 2016         Fibromyalgia and Diffuse DDD - diffuse chronic pain   Spinal surgery with DR Terrell at [facility], [place] on July 9th 2019 - 2 spurs were removed and found ligaments pressing against a nerve - took away stabbing pains   Did not feel well on Gabapentin   Meds - Etodolac  mg prn - needs it when she over does it     Cymbalta in the past - terrible jaw clenching              Takes alprazolam  1 mg -     1/4 tab to help sleep when has severe pain              insomnia -sleeps well the first few hours , then wakes up and thinks   (Trazodone did not help)         RLS - severe headaches with ropinirole       Vit D Def -   found a D she can take      On B12 too          Gets sores in the pubic area occas - mupirocin helps      AR - stays on flonase         vaccines -   ALLERGIC TO TETNAS,   DOES NOT GET FLU SHOTS;   SHINGLES - NOT SURE   DID GET COVID VACCINES x 2 and boosters      Not the new one           Review of Systems    Objective   /68 (BP Location: Right  arm, Patient Position: Sitting, BP Cuff Size: Large adult)   Pulse 64   Wt 80.3 kg (177 lb)   SpO2 98%   BMI 28.57 kg/m²     Physical Exam  Vitals reviewed.   Constitutional:       Appearance: Normal appearance.   HENT:      Head: Normocephalic and atraumatic.   Cardiovascular:      Rate and Rhythm: Normal rate and regular rhythm.   Pulmonary:      Effort: Pulmonary effort is normal.      Breath sounds: Normal breath sounds.   Abdominal:      Palpations: Abdomen is soft.      Tenderness: There is no abdominal tenderness.   Musculoskeletal:         General: Tenderness present.      Comments: Significant pain and muscle spasm palpated on right of cervical spine and thoracic spine    Skin:     General: Skin is warm and dry.   Neurological:      Mental Status: She is alert.      Comments: Word finding issues through appt          Assessment/Plan   Assessment & Plan  Cervical radiculopathy    Orders:    MR cervical spine w and wo IV contrast; Future    Due to chronic pain in the neck and the radicular symptoms into her upper back,   Tingling into hands and arms -   AND with the possible demyelinating lesions on her brain MRI -   I feel it is medically necessary to check MRIs of the cervical and thoracic spine to rule out spinal demyelinating lesions,  as well as possible severe DDD lesions that could need surgical repair to find the patient relief of pain.        Likely will need to see neuro   Demyelinating changes in brain (Multi)    Orders:    MR cervical spine w and wo IV contrast; Future    MR thoracic spine w and wo IV contrast; Future    Comprehensive Metabolic Panel    CBC and Auto Differential    Magnesium    Vitamin B12    Thoracic radiculopathy    Orders:    MR thoracic spine w and wo IV contrast; Future    Vitamin B12 deficiency    Orders:    Vitamin B12    Hypothyroidism, unspecified type    Orders:    Thyroid Stimulating Hormone    Vitamin D deficiency    Orders:    Vitamin D 25-Hydroxy,Total (for eval  of Vitamin D levels)    Anxiety and depression       Discussion with pt today that I feel strongly that depression is making whatever she has going on worse.   She agreed to try the escitalopram - she does not tolerate meds well - will start at just 2.5 mg daily.   Other polyneuropathy             Agreed to phone appt after MRI's done.         We discussed at visit any disease processes that were of concern as well as the risks, benefits and instructions of any new medication provided.    See orders and discussion section for information provided to patient in their After Visit Summary.   Patient (and/or caretaker of patient if present)  stated all questions were answered, and they voiced understanding of instructions.

## 2024-11-25 NOTE — PATIENT INSTRUCTIONS
Plan to make a phone appt after the MRIs  -  will need to call when you find out they are happening.     Try the escitalopram 2.5 mg a day first -  for at least a week - if tolerating and not helping much try 5 mg daily.    Take at night if it makes you tired.

## 2024-12-10 ENCOUNTER — APPOINTMENT (OUTPATIENT)
Dept: RADIOLOGY | Facility: HOSPITAL | Age: 59
End: 2024-12-10
Payer: COMMERCIAL

## 2024-12-11 ENCOUNTER — HOSPITAL ENCOUNTER (OUTPATIENT)
Dept: RADIOLOGY | Facility: HOSPITAL | Age: 59
Discharge: HOME | End: 2024-12-11
Payer: COMMERCIAL

## 2024-12-11 DIAGNOSIS — G37.9 DEMYELINATING CHANGES IN BRAIN (MULTI): ICD-10-CM

## 2024-12-11 DIAGNOSIS — M54.12 CERVICAL RADICULOPATHY: ICD-10-CM

## 2024-12-11 DIAGNOSIS — M54.14 THORACIC RADICULOPATHY: ICD-10-CM

## 2024-12-11 PROCEDURE — 72157 MRI CHEST SPINE W/O & W/DYE: CPT | Performed by: RADIOLOGY

## 2024-12-11 PROCEDURE — 72156 MRI NECK SPINE W/O & W/DYE: CPT | Performed by: RADIOLOGY

## 2024-12-11 PROCEDURE — 2550000001 HC RX 255 CONTRASTS: Performed by: FAMILY MEDICINE

## 2024-12-11 PROCEDURE — A9575 INJ GADOTERATE MEGLUMI 0.1ML: HCPCS | Performed by: FAMILY MEDICINE

## 2024-12-11 PROCEDURE — 72156 MRI NECK SPINE W/O & W/DYE: CPT

## 2024-12-11 PROCEDURE — 72157 MRI CHEST SPINE W/O & W/DYE: CPT

## 2024-12-11 RX ORDER — GADOTERATE MEGLUMINE 376.9 MG/ML
0.2 INJECTION INTRAVENOUS
Status: COMPLETED | OUTPATIENT
Start: 2024-12-11 | End: 2024-12-11

## 2024-12-12 ENCOUNTER — APPOINTMENT (OUTPATIENT)
Dept: PHYSICAL MEDICINE AND REHAB | Facility: CLINIC | Age: 59
End: 2024-12-12
Payer: COMMERCIAL

## 2024-12-12 DIAGNOSIS — E03.9 HYPOTHYROIDISM, UNSPECIFIED TYPE: ICD-10-CM

## 2024-12-12 RX ORDER — LEVOTHYROXINE SODIUM 75 UG/1
TABLET ORAL
Qty: 90 TABLET | Refills: 3 | Status: SHIPPED | OUTPATIENT
Start: 2024-12-12

## 2024-12-18 ENCOUNTER — APPOINTMENT (OUTPATIENT)
Dept: PRIMARY CARE | Facility: CLINIC | Age: 59
End: 2024-12-18
Payer: COMMERCIAL

## 2024-12-18 DIAGNOSIS — K63.8219 SMALL INTESTINAL BACTERIAL OVERGROWTH (SIBO): ICD-10-CM

## 2024-12-18 DIAGNOSIS — M54.12 CERVICAL RADICULITIS: Primary | ICD-10-CM

## 2024-12-18 PROBLEM — L73.9 DISORDER OF SEBACEOUS GLANDS: Status: RESOLVED | Noted: 2019-04-22 | Resolved: 2024-12-18

## 2024-12-18 PROBLEM — A63.0 CONDYLOMA ACUMINATUM: Status: RESOLVED | Noted: 2023-05-24 | Resolved: 2024-12-18

## 2024-12-18 PROBLEM — B35.1 ONYCHOMYCOSIS OF GREAT TOE: Status: RESOLVED | Noted: 2023-05-24 | Resolved: 2024-12-18

## 2024-12-18 PROBLEM — M19.079 ARTHRITIS OF GREAT TOE AT METATARSOPHALANGEAL JOINT: Status: RESOLVED | Noted: 2023-05-24 | Resolved: 2024-12-18

## 2024-12-18 PROBLEM — N84.2 VAGINAL POLYP: Status: RESOLVED | Noted: 2023-05-24 | Resolved: 2024-12-18

## 2024-12-18 PROCEDURE — 99441 PR PHYS/QHP TELEPHONE EVALUATION 5-10 MIN: CPT | Performed by: FAMILY MEDICINE

## 2024-12-18 NOTE — PROGRESS NOTES
Subjective   Patient ID: Rachel Hobbs is a 59 y.o. female who presents for Follow-up (Go over MRI results.).    HPI     Virtual or Telephone Consent    A telephone visit (audio only) between the patient (at the originating site) and the provider (at the distant site) was utilized to provide this telehealth service.   Verbal consent was requested and obtained from Rachel Hobbs on this date, 12/18/24 for a telehealth visit.        Reviewed MRI -   DDD through the cervical and thoracic spine Mild-to-moderate right C5-C6  neuroforaminal stenosis.      Also -   She saw GI -   Dx with SIBO -   Placed on flagyl and had amazing results with the pain in her feet and her stomach feels much better!     Review of Systems    Objective   There were no vitals taken for this visit.    Physical Exam    NAD     Assessment/Plan   Assessment & Plan  Cervical radiculitis         Discussed seeing DR Rob -   Did not want to yet   Small intestinal bacterial overgrowth (SIBO)       working with GI - seeing great improvement.

## 2025-02-04 ENCOUNTER — APPOINTMENT (OUTPATIENT)
Dept: ORTHOPEDIC SURGERY | Facility: CLINIC | Age: 60
End: 2025-02-04
Payer: COMMERCIAL

## 2025-02-25 ENCOUNTER — OFFICE VISIT (OUTPATIENT)
Dept: ORTHOPEDIC SURGERY | Facility: CLINIC | Age: 60
End: 2025-02-25
Payer: COMMERCIAL

## 2025-02-25 DIAGNOSIS — M77.01 MEDIAL EPICONDYLITIS OF ELBOW, RIGHT: Primary | ICD-10-CM

## 2025-02-25 PROCEDURE — 1036F TOBACCO NON-USER: CPT | Performed by: ORTHOPAEDIC SURGERY

## 2025-02-25 PROCEDURE — 2500000004 HC RX 250 GENERAL PHARMACY W/ HCPCS (ALT 636 FOR OP/ED): Mod: JW | Performed by: ORTHOPAEDIC SURGERY

## 2025-02-25 PROCEDURE — 76942 ECHO GUIDE FOR BIOPSY: CPT | Performed by: ORTHOPAEDIC SURGERY

## 2025-02-25 PROCEDURE — 99213 OFFICE O/P EST LOW 20 MIN: CPT | Mod: 25 | Performed by: ORTHOPAEDIC SURGERY

## 2025-02-25 PROCEDURE — 99213 OFFICE O/P EST LOW 20 MIN: CPT | Performed by: ORTHOPAEDIC SURGERY

## 2025-02-25 PROCEDURE — 20551 NJX 1 TENDON ORIGIN/INSJ: CPT | Performed by: ORTHOPAEDIC SURGERY

## 2025-02-25 RX ORDER — LIDOCAINE HYDROCHLORIDE 10 MG/ML
1 INJECTION, SOLUTION INFILTRATION; PERINEURAL
Status: COMPLETED | OUTPATIENT
Start: 2025-02-25 | End: 2025-02-25

## 2025-02-25 RX ORDER — METHYLPREDNISOLONE ACETATE 40 MG/ML
30 INJECTION, SUSPENSION INTRA-ARTICULAR; INTRALESIONAL; INTRAMUSCULAR; SOFT TISSUE
Status: COMPLETED | OUTPATIENT
Start: 2025-02-25 | End: 2025-02-25

## 2025-02-25 RX ADMIN — METHYLPREDNISOLONE ACETATE 30 MG: 40 INJECTION, SUSPENSION INTRA-ARTICULAR; INTRALESIONAL; INTRAMUSCULAR; SOFT TISSUE at 12:28

## 2025-02-25 RX ADMIN — LIDOCAINE HYDROCHLORIDE 1 ML: 10 INJECTION, SOLUTION INFILTRATION; PERINEURAL at 12:28

## 2025-02-25 ASSESSMENT — PAIN - FUNCTIONAL ASSESSMENT: PAIN_FUNCTIONAL_ASSESSMENT: 0-10

## 2025-02-25 ASSESSMENT — ENCOUNTER SYMPTOMS
WHEEZING: 0
WOUND: 0
SHORTNESS OF BREATH: 0
JOINT SWELLING: 0
TROUBLE SWALLOWING: 0
EYE DISCHARGE: 0
CHILLS: 0
FEVER: 0
ARTHRALGIAS: 1

## 2025-02-25 ASSESSMENT — PAIN SCALES - GENERAL: PAINLEVEL_OUTOF10: 2

## 2025-02-25 NOTE — PROGRESS NOTES
Reason for Appointment  Chief Complaint   Patient presents with    Right Elbow - Pain     History of Present Illness  Patient is a 60 y.o. female here today for a new problem of right elbow pain.  We have seen her in the past for the right wrist. She was playing tug-of-war with her dog about 2 months ago when she had increased medial sided elbow pain worse with gripping and repetitive activity.  Symptoms have slowly improved, she is not getting any numbness in the fingers.  No other changes in her past medical history, allergies, or medications.    Past Medical History:   Diagnosis Date    Acute bronchitis due to other specified organisms 12/03/2015    Acute bronchitis due to other specified organisms    Acute upper respiratory infection, unspecified 12/06/2018    Acute upper respiratory infection    Asymptomatic premature menopause     Premature menopause    Cutaneous abscess of limb, unspecified 04/05/2018    Abscess of leg    Hordeolum externum unspecified eye, unspecified eyelid     Stye    Mastodynia 02/16/2016    Breast pain    Other acute sinusitis 12/23/2017    Other acute sinusitis, recurrence not specified    Other intervertebral disc degeneration, lumbar region     Lumbar degenerative disc disease    Pain in throat 07/13/2020    Throat discomfort    Panic disorder (episodic paroxysmal anxiety) 02/07/2014    Panic disorder without agoraphobia    Personal history of diseases of the skin and subcutaneous tissue 03/14/2016    History of atopic dermatitis    Personal history of other diseases of the circulatory system 07/26/2018    History of lymphadenitis    Personal history of other diseases of the digestive system 03/26/2019    History of acute gastritis    Personal history of other diseases of the respiratory system 06/26/2020    History of acute pharyngitis    Personal history of other diseases of the respiratory system 03/31/2015    History of acute sinusitis    Personal history of other infectious and  parasitic diseases 06/05/2013    History of candidiasis    Restless legs syndrome     Restless legs syndrome    Unspecified contact dermatitis due to other agents 02/21/2022    Contact dermatitis due to other agent    Unspecified convulsions (Multi)     Seizure    Unspecified lump in unspecified breast 12/12/2014    Breast nodule    Vaginal cyst        Past Surgical History:   Procedure Laterality Date    APPENDECTOMY  11/11/2013    Appendectomy    BREAST BIOPSY Right 1995    benign excisional bx    BREAST CYST ASPIRATION Right 1996    benign    BREAST SURGERY  05/20/2013    Breast Surgery    COLONOSCOPY  05/21/2019    Complete Colonoscopy    HYSTERECTOMY  05/20/2013    Hysterectomy    OTHER SURGICAL HISTORY  05/20/2013    Nerve Block Transforaminal Epidural    OTHER SURGICAL HISTORY  05/21/2019    Esophagogastroduodenoscopy       Medication Documentation Review Audit       Reviewed by Jen Garcia MA (Medical Assistant) on 02/25/25 at 0845      Medication Order Taking? Sig Documenting Provider Last Dose Status   ALPRAZolam (Xanax) 1 mg tablet [id number] Yes Take 1 tablet (1 mg) by mouth as needed at bedtime for anxiety. Carisa Castle MD Taking Differently Active   aspirin 81 mg capsule [id number] Yes Take 81 mg by mouth once daily. Historical Provider, MD Taking Active   clobetasol (Temovate) 0.05 % external solution [id number] Yes APPLY AND GENTLY MASSAGE INTO AFFECTED AREA(S) ONCE DAILY AS NEEDED Carisa Castle MD Taking Active   coenzyme Q-10 100 mg capsule [id number] Yes Take by mouth. Historical Provider, MD Taking Active   cyanocobalamin (Vitamin B-12) 1,000 mcg tablet [id number] Yes Take 1 tablet (1,000 mcg) by mouth once daily. Historical Provider, MD Taking Active   etodolac XL (Lodine XL) 400 mg 24 hr tablet [id number] Yes Take 1 tablet (400 mg) by mouth once daily as needed (pain). Carisa Castle MD Taking Active   fluticasone (Flonase) 50 mcg/actuation nasal spray [id number]  Yes Administer into affected nostril(s) once daily. Historical Provider, MD Taking Active   gabapentin (Neurontin) 100 mg capsule [id number]  Take 1 capsule (100 mg) by mouth 3 times a day for 7 days, THEN 2 capsules (200 mg) 3 times a day for 7 days, THEN 3 capsules (300 mg) 3 times a day.   Patient not taking: Reported on 2024    Deneen Saleh MD   [id number]   magnesium oxide (Mag-Ox) 400 mg tablet [id number] Yes 1 tablet (400 mg) once daily. Historical Provider, MD Taking Active   multivitamin tablet [id number] Yes Take 1 tablet by mouth once daily. Historical Provider, MD Taking Active   mv-min-FA-vit K-lutein-zeaxant (PreserVision AREDS 2 Plus MV) 200 mcg-15 mcg- 5 mg-1 mg capsule [id number] Yes Take by mouth. Historical Provider, MD Taking Active   progesterone micronized 100 % powder powder [id number] Yes Apply 0.2mL topically once daily. Carisa Castle MD Taking Active   Sutab 1.479-0.188- 0.225 gram tablet [id number] Yes  Historical Provider, MD Taking Active   Synthroid 75 mcg tablet [id number] Yes TAKE ONE TABLET BY MOUTH EVERY DAY, BUT ON TUESDAY AND THURSDAY TAKE 1/2 TABLET Cairsa Castle MD  Active   Vivelle-Dot 0.0375 mg/24 hr [id number] Yes Place 1 patch over 96 hours on the skin 2 times a week. Carisa Castle MD Taking Active                    Allergies   Allergen Reactions    Other Other    Statins-Hmg-Coa Reductase Inhibitors Other    Tetanus Toxoid Unknown       Review of Systems   Constitutional:  Negative for chills and fever.   HENT:  Negative for postnasal drip, sneezing and trouble swallowing.    Eyes:  Negative for discharge.   Respiratory:  Negative for shortness of breath and wheezing.    Cardiovascular:  Negative for chest pain.   Musculoskeletal:  Positive for arthralgias. Negative for joint swelling.   Skin:  Negative for rash and wound.   All other systems reviewed and are negative.    Exam   On exam she has good flexion and extension of the  elbow, tenderness over the right medial epicondyle.  No gross instability, no lateral sided tenderness.  No ulnar nerve symptoms.  Good pulses and sensation in the upper extremity.    Assessment   Right medial epicondylitis    Plan   She is having classic medial epicondylitis symptoms today.  We discussed conservative treatment today with an injection to calm down her symptoms and she does have a wrist brace that she can wear at night.  We sterilely injected under ultrasound guidance Depo-Medrol lidocaine in the right medial epicondylar region.  Patient understands the small risk of infection and the signs to look for as well as flare reaction.  Hopefully this gives her good relief.  She can follow-up with us as needed.  Patient ID: Rachel Hobbs is a 60 y.o. female.    Hand / UE Inj/Asp: R elbow for medial epicondylitis on 2/25/2025 12:28 PM  Indications: pain  Details: 25 G needle, ultrasound-guided  Medications: 1 mL lidocaine 10 mg/mL (1 %); 30 mg methylPREDNISolone acetate 40 mg/mL  Outcome: tolerated well, no immediate complications    After discussing the risks and benefits of the procedure we proceeded with the injection. Using ultrasound guidance we identified the medial epicondyle and the ulnar nerve along with the flexor pronator mass, images obtained.  We gently aspirated and sterilely injected a mixture of 30 mg of DepoMedrol and 1 cc of 1% lidocaine into the right medial epicondyle. Pt tolerated the procedure well without any adverse effects.    Procedure, treatment alternatives, risks and benefits explained, specific risks discussed. Consent was given by the patient. Immediately prior to procedure a time out was called to verify the correct patient, procedure, equipment, support staff and site/side marked as required. Patient was prepped and draped in the usual sterile fashion.         Bette FERNANDO PA-C, am acting as a scribe and attest that this documentation has been prepared under the  direction and in the presence of Sergio Perez MD.    By signing below, I, Sergio Perez MD, personally performed the services described in this documentation. All medical record entries made by the scribe were at my direction and in my presence. I have reviewed the chart and agree that the record reflects my personal performance and is accurate and complete.

## 2025-03-03 DIAGNOSIS — Z79.890 HORMONE REPLACEMENT THERAPY: ICD-10-CM

## 2025-03-04 RX ORDER — PROGESTERONE, MICRONIZED 100 %
POWDER (GRAM) MISCELLANEOUS
Qty: 100 G | Status: SHIPPED | OUTPATIENT
Start: 2025-03-04

## 2025-03-09 ENCOUNTER — PATIENT MESSAGE (OUTPATIENT)
Dept: PRIMARY CARE | Facility: CLINIC | Age: 60
End: 2025-03-09
Payer: COMMERCIAL

## 2025-03-09 DIAGNOSIS — B37.9 INFECTION DUE TO CANDIDA ALBICANS: Primary | ICD-10-CM

## 2025-03-10 RX ORDER — FLUCONAZOLE 100 MG/1
100 TABLET ORAL DAILY
Qty: 5 TABLET | Refills: 0 | Status: SHIPPED | OUTPATIENT
Start: 2025-03-10 | End: 2025-03-15

## 2025-03-13 ENCOUNTER — OFFICE VISIT (OUTPATIENT)
Dept: PRIMARY CARE | Facility: CLINIC | Age: 60
End: 2025-03-13
Payer: COMMERCIAL

## 2025-03-13 VITALS
SYSTOLIC BLOOD PRESSURE: 108 MMHG | WEIGHT: 169 LBS | HEART RATE: 82 BPM | DIASTOLIC BLOOD PRESSURE: 64 MMHG | OXYGEN SATURATION: 98 % | BODY MASS INDEX: 27.28 KG/M2

## 2025-03-13 DIAGNOSIS — L30.9 DERMATITIS: Primary | ICD-10-CM

## 2025-03-13 PROCEDURE — 99213 OFFICE O/P EST LOW 20 MIN: CPT | Performed by: FAMILY MEDICINE

## 2025-03-13 PROCEDURE — 1036F TOBACCO NON-USER: CPT | Performed by: FAMILY MEDICINE

## 2025-03-13 RX ORDER — TRIAMCINOLONE ACETONIDE 1 MG/G
CREAM TOPICAL 2 TIMES DAILY
Qty: 30 G | Refills: 0 | Status: SHIPPED | OUTPATIENT
Start: 2025-03-13

## 2025-03-13 RX ORDER — HYDROXYZINE HYDROCHLORIDE 10 MG/1
10 TABLET, FILM COATED ORAL EVERY 6 HOURS PRN
Qty: 30 TABLET | Refills: 0 | Status: SHIPPED | OUTPATIENT
Start: 2025-03-13 | End: 2025-04-12

## 2025-03-13 ASSESSMENT — PATIENT HEALTH QUESTIONNAIRE - PHQ9
1. LITTLE INTEREST OR PLEASURE IN DOING THINGS: NOT AT ALL
SUM OF ALL RESPONSES TO PHQ9 QUESTIONS 1 AND 2: 0
2. FEELING DOWN, DEPRESSED OR HOPELESS: NOT AT ALL

## 2025-03-13 NOTE — PROGRESS NOTES
Subjective   Patient ID: Rachel Hobbs is a 60 y.o. female who presents for Rash (Broke out around the 20th of February.  On her back, lower abdomen and head.  Benadryl and other otc stuff is not working on it. Very itchy.).    Rash         Feb 20th back from [place]  - had a great trip but was very anxious about the travel     Itch started 2 days later -   Started under the breasts - itchy and a rash   Then it spread and all over  -   All the torso     Did not wash anything in [place]  -   Took her  own soaps and lotions     Benedryl helped somewhat         Review of Systems   Skin:  Positive for rash.       Objective   /64 (BP Location: Left arm, Patient Position: Sitting, BP Cuff Size: Large adult)   Pulse 82   Wt 76.7 kg (169 lb)   SpO2 98%   BMI 27.28 kg/m²     Physical Exam  Vitals reviewed.   Constitutional:       Appearance: Normal appearance.   Skin:     General: Skin is dry.      Comments: Excoriations - patch left side of lower abdomen and mid upper back  - healing   Dry skin and mild excoriations right lower abdomen and mid lower back     No other rash , no papules or erythema    Neurological:      Mental Status: She is alert.         Assessment/Plan   Assessment & Plan  Dermatitis    Orders:    triamcinolone (Kenalog) 0.1 % cream; Apply topically 2 times a day. Apply to affected area 1-2 times daily as needed. Avoid face and groin.    hydrOXYzine HCL (Atarax) 10 mg tablet; Take 1 tablet (10 mg) by mouth every 6 hours if needed for itching.      Will try this - and Aquaphor -   To let me know if not working     We discussed at visit any disease processes that were of concern as well as the risks, benefits and instructions of any new medication provided.    See orders and discussion section for information provided to patient in their After Visit Summary.   Patient (and/or caretaker of patient if present)  stated all questions were answered, and they voiced understanding of instructions.

## 2025-03-13 NOTE — PATIENT INSTRUCTIONS
"Lets have you try the triamcinolone cream to calm the itchy areas down -   Can use up to twice a day     Can take Hydroxyzine 10 mg  -  one up to every 6 hours as needed -   Can take 2 as needed  - may make you tired .     ______________________________  This is likely eczema -  the \"itch that rashes.\"    Its very important to keep the skin soft and supple - using vaseline or aquaphor often helps to keep the rash from getting worse.     When its flared up, red and itchy, you can use  triamcinolone  -  2 -3 times a day for up to 10 days - to calm it down     Try to find the trigger that makes it worse - its commonly soaps or detergents.    The dryer the skin gets, the worse it gets.           "

## 2025-03-13 NOTE — ASSESSMENT & PLAN NOTE
Orders:    triamcinolone (Kenalog) 0.1 % cream; Apply topically 2 times a day. Apply to affected area 1-2 times daily as needed. Avoid face and groin.    hydrOXYzine HCL (Atarax) 10 mg tablet; Take 1 tablet (10 mg) by mouth every 6 hours if needed for itching.

## 2025-05-30 ENCOUNTER — APPOINTMENT (OUTPATIENT)
Dept: PRIMARY CARE | Facility: CLINIC | Age: 60
End: 2025-05-30
Payer: COMMERCIAL

## 2025-05-30 VITALS
BODY MASS INDEX: 27.48 KG/M2 | OXYGEN SATURATION: 98 % | SYSTOLIC BLOOD PRESSURE: 122 MMHG | HEIGHT: 66 IN | WEIGHT: 171 LBS | HEART RATE: 76 BPM | DIASTOLIC BLOOD PRESSURE: 68 MMHG

## 2025-05-30 DIAGNOSIS — Z12.31 SCREENING MAMMOGRAM, ENCOUNTER FOR: ICD-10-CM

## 2025-05-30 DIAGNOSIS — Z79.890 HORMONE REPLACEMENT THERAPY: Primary | ICD-10-CM

## 2025-05-30 DIAGNOSIS — Z00.00 WELLNESS EXAMINATION: ICD-10-CM

## 2025-05-30 PROBLEM — R56.9 SEIZURE (MULTI): Status: RESOLVED | Noted: 2024-10-03 | Resolved: 2025-05-30

## 2025-05-30 PROCEDURE — 3008F BODY MASS INDEX DOCD: CPT | Performed by: FAMILY MEDICINE

## 2025-05-30 PROCEDURE — 99396 PREV VISIT EST AGE 40-64: CPT | Performed by: FAMILY MEDICINE

## 2025-05-30 PROCEDURE — 1036F TOBACCO NON-USER: CPT | Performed by: FAMILY MEDICINE

## 2025-05-30 RX ORDER — ESTRADIOL 0.03 MG/D
1 PATCH, EXTENDED RELEASE TRANSDERMAL 2 TIMES WEEKLY
Qty: 8 PATCH | Refills: 11 | Status: SHIPPED | OUTPATIENT
Start: 2025-06-02 | End: 2026-06-02

## 2025-05-30 ASSESSMENT — PATIENT HEALTH QUESTIONNAIRE - PHQ9
2. FEELING DOWN, DEPRESSED OR HOPELESS: NOT AT ALL
SUM OF ALL RESPONSES TO PHQ9 QUESTIONS 1 AND 2: 0
1. LITTLE INTEREST OR PLEASURE IN DOING THINGS: NOT AT ALL

## 2025-05-30 NOTE — PATIENT INSTRUCTIONS
Please have colonoscopy report faxed    [phone]      Lets try lower on the Vivelle Dot      You have to find a regular work out routine that you do every day for up to an hour.        WELL VISIT/PREVENTATIVE VISIT INSTRUCTIONS:        Call [phone] to schedule any testing ordered at [facility].      For a mammogram, call   [phone]    Please work on healthy nutrition,  optimal sleep, and regular exercise to feel better.    If you smoke - please quit, and if you drink alcohol, please limit your intake.       Be sure to ask me about any vaccines you may be due for,  and ask me any questions you may have about vaccines.   Generally -  a flu shot is recommended every fall for everyone over 6 months of age,  a pneumonia shot is recommended for anyone 65 and over or who has chronic conditions such as diabetes, heart or lung disease,  the shingles vaccines are recommended for people age 50 and over,   a Tetnas/Pertussis booster is very 10 years (after the childhood set);  the RSV vaccine is for people age 65 and over,  and the COVID vaccines are recommended for everyone, but the boosters are often particular people, so ask me if you qualify.      There may be more vaccines you qualify for depending on your medical conditions, so be sure to ask me about that if you have any chronic illnesses.    Some people have insurance that will pay for the vaccines at the pharmacy, and some your doctors office - so be sure to check with your insurance about the best place to get your vaccines and your coverage of them.     Generally speaking,  good nutrition consists of lean meats, like chicken, fish and turkey (not fried);    plenty of fruits and vegetables (the fresher the better);   Less sugars, saturated fats, and processed foods - especially those made with white flour.   Try to eat the majority of your grain foods  as whole grains.   Keep an eye on your total calories in a day and serving sizes on packaging  "- this will help you limit your overall intake.    Remember, to lose weight (if you need to), your total calorie intake has to be about 300 - 500 calories less than what you burn in a day.   That number depends on your age, gender and body size.   Some people find a fitbit (calorie tracking device) helpful.      Please be sure to see the dentist every 6 months.    Please see the eye doctor no longer than every 2 years.    When you have your Living Will and Medical Power of  papers completed   (they have to be witnessed by 2 non-relatives or notarized to be legally binding),     please keep your originals in a safe place in your home, but make sure all your physicians have copies and that you take copies with you when you go to the hospital.        GETTING TEST RESULTS:    YOU WILL ALWAYS FIND OUT ABOUT ALL YOUR TEST RESULTS FROM ME.  I do not use the \"no news is good news\" policy.   The only time you would not, is if I have told you to get the testing done, and make a follow up appointment to go over it.    Then I will be waiting to talk to you at the visit about your test results.     I have a few different ways I get test results to you  (if its something urgent, we call you)  :       If you have a [employer] card -  I will have your test results on your secureach card within a week.  You should get a phone call telling you when the results are on the card, or just call the card in a week.   If two weeks go  by and you have not heard anything, call the card to see if the results are there,  and if not,  leave me a message.  If you are having trouble using [employer], they have a support line you should call :   [phone];   If you have lost your card, I need to know.     IT'S VERY IMPORTANT THAT YOU CALL TO LISTEN TO YOUR RESULTS, AS IT THEN LETS ME KNOW YOU GOT YOUR RESULTS.        If you get your test results on MY CHART,  you may commonly see your results even before I do.      I will always " "annotate a message on your results so you know that I saw them and how I feel about them.     If you need some help with MY CHART call the support number at [phone].    IF I mail your results to you,   I need to hear from you if you do not receive them within 2 weeks.      Be sure to let me know your preference for getting results.         BILLING FOR PREVENTATIVE VISITS.     Most insurance companies pay for a yearly \"Wellness Check\"  or they may call it a \"Preventative Physical\"   - but it's important to know what your plan covers ahead of the visit.    It's also a good idea to find out what sort of preventative testing they will cover for screening tests - like cholesterol, blood sugar, or colonoscopies. Also, find out which vaccines are covered and if you have any responsibility in paying for them.       If at your Wellness Visit,  we cover anything to do with problems/issues  you are having or  chronic medications/ medical conditions you have,   there will ALSO be a regular office charge that day.     Blood work  or testing obtained that has anything to do with an acute issue or chronic condition is billed under that diagnosis and not screening.       It's a good idea to find out from your insurance what is covered and what is your responsibility for all of the above possible charges.           Most importantly,   if you ever have any questions or concerns that cannot wait until your next visit with me,  you can message me through MY CHART or call the office and leave a voice mail message  (please allow for at least 24 hours for responses for these messages).       Take good care.   Dr Hubbard              For General Healthy Nutrition    (Remember - NOT A DIET!   Diets are only good for class reunions.)    These are my general good nutrition recommendations for most people.   I use the term \" diet \"  in these instructions to mean your overall nutrition - how you eat and drink.   If we talked about something " different during your visit with me,  other than what is written below,  follow that advice instead.       For most people,  eating healthier means getting less added sugar and less processed foods in your diet    The fresher the better.    Added sugar is now a part of the nutrition label on manufactured food, so you can keep an eye on it easier.    But basically,  foods and beverages  that contain regular sugar and corn syrup are the main sources of added sugars.  Eating as little of these foods as you can is best.   One shocking example of the epidemic of added sugar is soda.    One can of regular soda contains about as much added sugar as 3 regular size doughnuts!     The other issue with processed foods is the amount of processed grains they contain , such as white flour.    This is also something you want to try to limit in your diet.     But, grain products are very important for your nutrition.    Whole grains are better for your body.     Cutting back on white breads, traditional pasta, baked goods, white rice,  and processed cereals will be healthier for you.   The better choices include whole grain breads,  whole wheat pasta,  brown rice, quinoa, barley, steel cut  or rolled oats.   If you eat cereal for breakfast, try to look for one made with whole grains and less sugar.   There are many people who have a problem with gluten, for a large variety of reasons.    Generally,  products made with wheat flour , barley or rye are the primary source of gluten.       Cutting back on saturated fats is important.    You want the majority of the meat that you eat to be chicken, fish or turkey.   Baked or broiled is best -  fried adds too much fat.    There are healthy fats that are important - fat is important for holding down appetite, vitamin absorption and several metabolic processes in the body.  Monounsaturated fats raise HDL (good cholesterol) and lower LDL (bad cholesterol).   Olive oil, peanut oil, nuts,  "seeds, and avocados are great sources of the good fats.       Ideas are:   Trade sour cream dip for hummus (which is rich in olive oil) or guacamole; use veggies or whole-wheat chips to dip.    Nuts are an excellent source of protein and healthy fats.   Tree nuts are the best kind, such as almonds or walnuts.   Just be careful - they are high in calories, so stick to a serving size.  (Most are about 200 calories for a 1/4 cup)      Proteins are very important for your body, and they also hold down your appetite.   Try to have protein with every meal.    These generally are meats, nuts, many beans, legumes, eggs, and dairy.   You will find protein in whole grain products and some green vegetables have a little too.     When you have dairy (if you can - many people are lactose intolerant) try to make it low fat.    Ideas are 1% milk, lowfat yogurt or cheeses, low fat cottage cheese.   I don't generally recommend FAT FREE because they often contain artificial products to improve taste, and the fat helps hold down your appetite.   If you are lactose intolerant, try to see if taking Lactaid before having dairy helps.      Fresh fruits and vegetables are VERY important.  The brighter the better.   Many vegetables are considered \"Free Foods\" - meaning you can eat as much as you want, and it does not matter.  These include tomatoes, cucumbers, celery, peppers, all the various lettuces and kale - to name a few.   Potatoes, corn and peas are starchy, so do have more calories, but are still healthy - you just want to watch the amount of them you eat.       Fruits are full of wonderful nutrition.   They contain natural sugar called fructose, so eating them in moderation is best.   Diabetics may need to pay careful attention to how their body reacts to the sugar.  Some fruits might drastically increase their blood sugar.      Eating smaller meals with a couple of small snacks is better for your metabolism than not eating for " long amounts of time  (breakfast is very important).   Trying to avoid large meals is helpful too.    Eating like this helps keep your appetite down and keeps you in burning metabolism rather than storage metabolism so your body will use the calories you eat.       I do not tell people to stop eating sweets or snack foods - just limit the amounts you have.  The less the better.   Pay attention to serving sizes, and treat them as a treat.        Foods like doughnuts, pop tarts, sugar cereals, cookies  ARE NOT GOOD FOR BREAKFAST.   They are loaded with sugar and will cause you to be hungrier in the day and often not feel well.    Caffeine needs to be limited - no more than 2 servings a day.  Some people can't have any at all.    (if you have any sleep or anxiety issues - stop the caffeine)   Coffee, many teas, many sodas, energy drinks, almost any diet supplement,  and chocolate all contain caffeine.      Water is important.   For most people, 8   x  8 ounces  a day are needed.  This may vary for some health issues.    If you need to be on a low sodium diet, that means looking at labels and eating only 1000 - 2000 mg of sodium a day.    Calcium intake is important.  3 servings of a high calcium food or drink a day is recommended.   This is usually a cup of milk, a cup of yogurt, an ounce of a hard cheese or 1.5 ounces of a soft cheese are the usual servings.   There are other high calcium foods - including soy or almond milk, broccoli,  almonds, dark green leafy vegetable.   Make sure you are not getting more than 1000  - 1200 mg of total calcium a day (unless you have been told you need more by a doctor).    Vitamin D 3 is important to absorb the calcium and for your immune system.   For children, 400 IU a day is recommended.   For adults - 800 - 5000 IU a day  is recommended.  (Often the amount needed is individualized for adults - be sure to ask how much is right for you)    Physical activity is very important for  good health.    Finding activities that give you regular exercise is very important for good health.  Try to find exercise you enjoy doing on a regular basis.    30 minutes at least 5 days a week of a good cardiovascular exercise is recommended.   That means something that gets your heart rate going faster than your usual baseline and you can find yourself breathing harder than usual while you are exercising.  If you have not done any exercise in a long time,  make sure you ask if its safe for you to start,  and be sure to gradually work up to your goal.      If you need to lose weight,  following these recommendations will help you.   And if you are doing all of this and still not losing weight, then its likely just the amount of food you are eating.   Learn to cut back on portion sizes.  Using smaller plates may help.  Healthy weight loss is  only about a pound a week.   You have to remember that whatever you do to lose the weight, you must be prepared to keep it up for life for the weight to stay off.     A lot of people have a lot of luck with using something like a fit bit,  or a program where you keep track of all of your calories that you eat and what you burn off in the day.

## 2025-05-30 NOTE — ASSESSMENT & PLAN NOTE
Orders:    Vivelle-Dot 0.025 mg/24 hr patch; Place 1 patch over 96 hours on the skin 2 times a week.    Weaning estrogen

## 2025-05-30 NOTE — PROGRESS NOTES
Subjective   Patient ID: Rachel Hobbs is a 60 y.o. female who presents for well woman and follow up     HPI       LOV March for dermatitis     Since last med check a year ago -  Was dealing a lot with her neck pain -   We had discussed going to pain management for injection - she did not want to go yet    When she was treated for SIBO with flagyl  - her pain in her feet resolved    As of today:     Rash is better     HRT -   She is now on Vivelle Dot  0.0375 twice a week   Getting hot flashes once a day  lasting 30 sec to 1 min    Must get MILTON - reacts to generic   Willing to go lower   Some vaginal dryness and itch -   Using Aquaphor       Stress is  still high -   Still dealing with her parents   She feels her anxiety is under control if she does not travel     Keri to get  in [place] next year       Constipation - had to cut back on Magnesium -   Takes weekly  -   And takes Mirilax , on probiotics and stool softeners too   No pain - hard rabbit pellet stools       WAC -      Last WWE - 2024       Reviewed WWE paper today      Last pap - NONE NEEDED - S/P HYST - BUT ON HRT ;  Pelvic exam  -  polyp seen -   Sent to [facility] -  bx done - told benign     (Need GC/CT screen?) - low risk     LMP - NONE SINCE HYST - AGE 37 - COMPLETE HYST DUE TO ENDOMETRIOSIS - HRT SINCE THEN  problems? Still painful intercourse - very dry   AVOIDS - very tight pelvic muscles      HRT - see above   PROGESTERONE CREAM THIGHS -     VAGINAL ESTROGEN DID NOT HELP.     (CANNOT HAVE THE WEEKLY ESTROGEN PATCH - CLIMERA or Estradiol - REACTED TO both of them )     We have discussed risks of HRT therapy reglarly - she voices understanding and wishes to continue despite risk.          - 1  Para - 1     Last mammogram - 2024  - MAMM WNL   Breast issues? - NONE CURRENTLY     Bone density - OSTEOPENIA; ON VIT D; has calcium in her diet   Last DEXA   2024  - same      Last colonoscopy - 2019 - Colonoscopy - had polyp  removed - good for 5 years   COLONOSCOPY WAS done 5/2024 -   DR Macias - one polyp told 5 years  (Not on my chart)      Last cholesterol - VERY HIGH CHOLESTEROL - INTOL OF STATINS, ZETIA AND REPATHA -      Last blood sugar - 11/2023  - WNL      Hep C screen? -   NEG 5/2022   HIV screen? - LOW RISK     vaccines -   ALLERGIC TO TETNAS,   DOES NOT GET FLU SHOTS;   SHINGLES - NOT SURE   DID GET COVID VACCINES x 2 and boosterS   RSV - NOT YET      vision - THERE RECENTLY      dentist - DOES GO REGULARLY     BMI/weight - 27      nutrition - DOING WELL; tried no gluten - no help      exercise - REGULAR       smoking status - FORMER SMOKER - QUIT about (2013) - smoked 3 - 6 cig a day for 25 years   Need Screening Lung CT? Does not qualify      alcohol consumption - RARE     Need coronary calcium score? - ZERO IN 2024 (that was third scan)         Living Will and Medical POA papers. - DONE -  on chart           CHRONIC ISSUES REVIEWED TODAY :      HRT -   see above    Hypothyroid:    Synthroid 75 one daily - 1/2 Tues and Thurs ;   Not missing doses  Takes in the AM  -   Takes it alone  -  20 min later has black coffee      Familial Hyperlipidemia - (LDL over 300) - HAS NOT BEEN ABLE TO TOLERATE THE STATINS, ZETIA or Repatha   Cor Score Zero x 2 2011 and 2016 and 2024         Fibromyalgia and Diffuse DDD - diffuse chronic pain   Spinal surgery with DR Terrell at [facility] on July 9th 2019 - 2 spurs were removed and found ligaments pressing against a nerve - took away stabbing pains   Did not feel well on Gabapentin     Meds - not taking much      Pain is always there at about 3/10       Stretches 3 - 4 times a week     Exercise - 30 min          Takes alprazolam  1 mg  prn traveling for RLS         Has #4 on hand             insomnia -sleeps well the first few hours , then wakes up and thinks   (Trazodone did not help)         RLS - tried supplement - did not help much   And tried Ropinirole - could not take it         Vit D  "Def -   found a D she can take     On B12 too          Gets sores in the pubic area occas - mupirocin helps      AR - stays on flonase        vaccines -   ALLERGIC TO TETNAS,   DOES NOT GET FLU SHOTS;   SHINGLES - NOT SURE   DID GET COVID VACCINES x 2 and boosters      Not the new one         Review of Systems    Objective   /68   Pulse 76   Ht 1.676 m (5' 6\")   Wt 77.6 kg (171 lb)   SpO2 98%   BMI 27.60 kg/m²     Physical Exam  Constitutional:       Appearance: Normal appearance.   HENT:      Head: Normocephalic and atraumatic.      Right Ear: Tympanic membrane, ear canal and external ear normal.      Left Ear: Tympanic membrane, ear canal and external ear normal.      Nose: Nose normal.      Mouth/Throat:      Mouth: Mucous membranes are moist.      Pharynx: No posterior oropharyngeal erythema.   Eyes:      General: No scleral icterus.     Extraocular Movements: Extraocular movements intact.      Pupils: Pupils are equal, round, and reactive to light.   Cardiovascular:      Rate and Rhythm: Normal rate and regular rhythm.      Pulses: Normal pulses.      Heart sounds: Normal heart sounds. No murmur heard.  Pulmonary:      Effort: Pulmonary effort is normal. No respiratory distress.      Breath sounds: Normal breath sounds. No wheezing.   Chest:      Chest wall: No mass, deformity or tenderness.   Breasts:     Laron Score is 5.      Breasts are symmetrical.      Right: Normal. No mass, nipple discharge, skin change or tenderness.      Left: Normal. No mass, nipple discharge, skin change or tenderness.          Comments: Fibrocystic   Abdominal:      General: Bowel sounds are normal. There is no distension.      Palpations: Abdomen is soft.      Tenderness: There is no abdominal tenderness.   Genitourinary:     General: Normal vulva.      Exam position: Lithotomy position.      Pubic Area: No rash.       Labia:         Right: No lesion.         Left: No lesion.       Vagina: Normal. No vaginal discharge. "      Adnexa: Right adnexa normal and left adnexa normal.   Musculoskeletal:         General: Normal range of motion.      Cervical back: Neck supple. No rigidity.      Right lower leg: No edema.      Left lower leg: No edema.   Lymphadenopathy:      Cervical: No cervical adenopathy.      Upper Body:      Right upper body: No axillary adenopathy.      Left upper body: No axillary adenopathy.   Skin:     General: Skin is warm and dry.      Findings: No rash.   Neurological:      General: No focal deficit present.      Mental Status: She is alert and oriented to person, place, and time.   Psychiatric:         Mood and Affect: Mood normal.         Thought Content: Thought content normal.         Assessment & Plan  Wellness examination    Breast exam today   Declined pelvic   Hormone replacement therapy    Orders:    Vivelle-Dot 0.025 mg/24 hr patch; Place 1 patch over 96 hours on the skin 2 times a week.    Weaning estrogen   Screening mammogram, encounter for    Orders:    BI mammo bilateral screening tomosynthesis; Future       Several medical issues that would get better with regular work out routine -   She is working on it     Appt 6 mos     We discussed at visit any disease processes that were of concern as well as the risks, benefits and instructions of any new medication provided.    See orders and discussion section for information handed to patient on their Clinical Summary.   Patient (and/or caretaker of patient if present)  stated all questions were answered, and they voiced understanding of instructions.

## 2025-06-11 ENCOUNTER — APPOINTMENT (OUTPATIENT)
Dept: RADIOLOGY | Facility: HOSPITAL | Age: 60
End: 2025-06-11
Payer: COMMERCIAL

## 2025-06-11 VITALS — BODY MASS INDEX: 27 KG/M2 | HEIGHT: 66 IN | WEIGHT: 168 LBS

## 2025-06-11 DIAGNOSIS — Z12.31 SCREENING MAMMOGRAM, ENCOUNTER FOR: ICD-10-CM

## 2025-06-11 PROCEDURE — 77063 BREAST TOMOSYNTHESIS BI: CPT | Performed by: RADIOLOGY

## 2025-06-11 PROCEDURE — 77067 SCR MAMMO BI INCL CAD: CPT

## 2025-06-11 PROCEDURE — 77067 SCR MAMMO BI INCL CAD: CPT | Performed by: RADIOLOGY

## 2025-11-28 ENCOUNTER — APPOINTMENT (OUTPATIENT)
Dept: PRIMARY CARE | Facility: CLINIC | Age: 60
End: 2025-11-28
Payer: COMMERCIAL

## 2025-12-22 ENCOUNTER — APPOINTMENT (OUTPATIENT)
Dept: OTOLARYNGOLOGY | Facility: CLINIC | Age: 60
End: 2025-12-22
Payer: COMMERCIAL

## 2025-12-22 ENCOUNTER — APPOINTMENT (OUTPATIENT)
Dept: AUDIOLOGY | Facility: CLINIC | Age: 60
End: 2025-12-22
Payer: COMMERCIAL

## 2026-02-11 ENCOUNTER — APPOINTMENT (OUTPATIENT)
Dept: DERMATOLOGY | Facility: CLINIC | Age: 61
End: 2026-02-11
Payer: COMMERCIAL

## (undated) DEVICE — SYRINGE, LUER LOCK, 12ML

## (undated) DEVICE — COVER HANDLE LIGHT, STERIS, BLUE, STERILE

## (undated) DEVICE — SOLUTION, INJECTION, USP, SODIUM CHLORIDE 0.9%, .9, NACL, 1000 ML, BAG

## (undated) DEVICE — Device

## (undated) DEVICE — DRAPE, SHEET, FAN FOLDED, HALF, 44 X 58 IN, DISPOSABLE, LF, STERILE